# Patient Record
Sex: MALE | Race: WHITE | NOT HISPANIC OR LATINO | Employment: STUDENT
[De-identification: names, ages, dates, MRNs, and addresses within clinical notes are randomized per-mention and may not be internally consistent; named-entity substitution may affect disease eponyms.]

---

## 2017-05-26 ENCOUNTER — HEALTH MAINTENANCE LETTER (OUTPATIENT)
Age: 27
End: 2017-05-26

## 2017-07-06 RX ORDER — CETIRIZINE HYDROCHLORIDE 10 MG/1
10 TABLET ORAL DAILY PRN
COMMUNITY

## 2017-07-06 RX ORDER — TESTOSTERONE CYPIONATE 200 MG/ML
INJECTION, SOLUTION INTRAMUSCULAR WEEKLY
COMMUNITY

## 2017-07-06 RX ORDER — FLUTICASONE PROPIONATE 50 MCG
1 SPRAY, SUSPENSION (ML) NASAL DAILY
COMMUNITY

## 2017-07-07 ENCOUNTER — HOSPITAL ENCOUNTER (OUTPATIENT)
Facility: CLINIC | Age: 27
Discharge: HOME | End: 2017-07-07
Attending: PLASTIC SURGERY | Admitting: PLASTIC SURGERY
Payer: COMMERCIAL

## 2017-07-07 ENCOUNTER — ANESTHESIA (OUTPATIENT)
Dept: SURGERY | Facility: CLINIC | Age: 27
End: 2017-07-07
Payer: COMMERCIAL

## 2017-07-07 ENCOUNTER — ANESTHESIA EVENT (OUTPATIENT)
Dept: SURGERY | Facility: CLINIC | Age: 27
End: 2017-07-07
Payer: COMMERCIAL

## 2017-07-07 ENCOUNTER — SURGERY (OUTPATIENT)
Age: 27
End: 2017-07-07

## 2017-07-07 VITALS
RESPIRATION RATE: 16 BRPM | TEMPERATURE: 99 F | HEIGHT: 66 IN | WEIGHT: 211 LBS | SYSTOLIC BLOOD PRESSURE: 146 MMHG | BODY MASS INDEX: 33.91 KG/M2 | DIASTOLIC BLOOD PRESSURE: 106 MMHG | OXYGEN SATURATION: 95 %

## 2017-07-07 DIAGNOSIS — F64.0 GENDER DYSPHORIA IN ADOLESCENT AND ADULT: Primary | ICD-10-CM

## 2017-07-07 PROCEDURE — 71000013 ZZH RECOVERY PHASE 1 LEVEL 1 EA ADDTL HR: Performed by: PLASTIC SURGERY

## 2017-07-07 PROCEDURE — 27210995 ZZH RX 272: Performed by: PLASTIC SURGERY

## 2017-07-07 PROCEDURE — 88305 TISSUE EXAM BY PATHOLOGIST: CPT | Performed by: PLASTIC SURGERY

## 2017-07-07 PROCEDURE — 25000128 H RX IP 250 OP 636: Performed by: NURSE ANESTHETIST, CERTIFIED REGISTERED

## 2017-07-07 PROCEDURE — 40000170 ZZH STATISTIC PRE-PROCEDURE ASSESSMENT II: Performed by: PLASTIC SURGERY

## 2017-07-07 PROCEDURE — 36000058 ZZH SURGERY LEVEL 3 EA 15 ADDTL MIN: Performed by: PLASTIC SURGERY

## 2017-07-07 PROCEDURE — 25000128 H RX IP 250 OP 636: Performed by: ANESTHESIOLOGY

## 2017-07-07 PROCEDURE — 25000566 ZZH SEVOFLURANE, EA 15 MIN: Performed by: PLASTIC SURGERY

## 2017-07-07 PROCEDURE — S0020 INJECTION, BUPIVICAINE HYDRO: HCPCS | Performed by: PLASTIC SURGERY

## 2017-07-07 PROCEDURE — 25000132 ZZH RX MED GY IP 250 OP 250 PS 637: Performed by: PLASTIC SURGERY

## 2017-07-07 PROCEDURE — 71000027 ZZH RECOVERY PHASE 2 EACH 15 MINS: Performed by: PLASTIC SURGERY

## 2017-07-07 PROCEDURE — 37000008 ZZH ANESTHESIA TECHNICAL FEE, 1ST 30 MIN: Performed by: PLASTIC SURGERY

## 2017-07-07 PROCEDURE — 25000125 ZZHC RX 250: Performed by: PLASTIC SURGERY

## 2017-07-07 PROCEDURE — 25000132 ZZH RX MED GY IP 250 OP 250 PS 637: Performed by: ANESTHESIOLOGY

## 2017-07-07 PROCEDURE — 37000009 ZZH ANESTHESIA TECHNICAL FEE, EACH ADDTL 15 MIN: Performed by: PLASTIC SURGERY

## 2017-07-07 PROCEDURE — 27210794 ZZH OR GENERAL SUPPLY STERILE: Performed by: PLASTIC SURGERY

## 2017-07-07 PROCEDURE — 88305 TISSUE EXAM BY PATHOLOGIST: CPT | Mod: 26 | Performed by: PLASTIC SURGERY

## 2017-07-07 PROCEDURE — 25000128 H RX IP 250 OP 636: Performed by: PLASTIC SURGERY

## 2017-07-07 PROCEDURE — 36000056 ZZH SURGERY LEVEL 3 1ST 30 MIN: Performed by: PLASTIC SURGERY

## 2017-07-07 PROCEDURE — 25000125 ZZHC RX 250: Performed by: NURSE ANESTHETIST, CERTIFIED REGISTERED

## 2017-07-07 PROCEDURE — 71000012 ZZH RECOVERY PHASE 1 LEVEL 1 FIRST HR: Performed by: PLASTIC SURGERY

## 2017-07-07 RX ORDER — HYDROXYZINE HYDROCHLORIDE 25 MG/1
50 TABLET, FILM COATED ORAL ONCE
Status: COMPLETED | OUTPATIENT
Start: 2017-07-07 | End: 2017-07-07

## 2017-07-07 RX ORDER — SODIUM CHLORIDE, SODIUM LACTATE, POTASSIUM CHLORIDE, CALCIUM CHLORIDE 600; 310; 30; 20 MG/100ML; MG/100ML; MG/100ML; MG/100ML
INJECTION, SOLUTION INTRAVENOUS CONTINUOUS PRN
Status: DISCONTINUED | OUTPATIENT
Start: 2017-07-07 | End: 2017-07-07

## 2017-07-07 RX ORDER — HYDROCODONE BITARTRATE AND ACETAMINOPHEN 5; 325 MG/1; MG/1
1-2 TABLET ORAL EVERY 4 HOURS PRN
Qty: 40 TABLET | Refills: 0 | Status: SHIPPED | OUTPATIENT
Start: 2017-07-07

## 2017-07-07 RX ORDER — FENTANYL CITRATE 50 UG/ML
25-50 INJECTION, SOLUTION INTRAMUSCULAR; INTRAVENOUS
Status: DISCONTINUED | OUTPATIENT
Start: 2017-07-07 | End: 2017-07-07 | Stop reason: HOSPADM

## 2017-07-07 RX ORDER — ONDANSETRON 2 MG/ML
4 INJECTION INTRAMUSCULAR; INTRAVENOUS EVERY 30 MIN PRN
Status: DISCONTINUED | OUTPATIENT
Start: 2017-07-07 | End: 2017-07-07 | Stop reason: HOSPADM

## 2017-07-07 RX ORDER — FENTANYL CITRATE 50 UG/ML
INJECTION, SOLUTION INTRAMUSCULAR; INTRAVENOUS PRN
Status: DISCONTINUED | OUTPATIENT
Start: 2017-07-07 | End: 2017-07-07

## 2017-07-07 RX ORDER — MINERAL OIL
OIL (ML) MISCELLANEOUS
Status: DISCONTINUED
Start: 2017-07-07 | End: 2017-07-07 | Stop reason: WASHOUT

## 2017-07-07 RX ORDER — ONDANSETRON 4 MG/1
4 TABLET, ORALLY DISINTEGRATING ORAL EVERY 30 MIN PRN
Status: DISCONTINUED | OUTPATIENT
Start: 2017-07-07 | End: 2017-07-07 | Stop reason: HOSPADM

## 2017-07-07 RX ORDER — DEXAMETHASONE SODIUM PHOSPHATE 4 MG/ML
INJECTION, SOLUTION INTRA-ARTICULAR; INTRALESIONAL; INTRAMUSCULAR; INTRAVENOUS; SOFT TISSUE PRN
Status: DISCONTINUED | OUTPATIENT
Start: 2017-07-07 | End: 2017-07-07

## 2017-07-07 RX ORDER — CEFAZOLIN SODIUM 1 G/3ML
1 INJECTION, POWDER, FOR SOLUTION INTRAMUSCULAR; INTRAVENOUS SEE ADMIN INSTRUCTIONS
Status: DISCONTINUED | OUTPATIENT
Start: 2017-07-07 | End: 2017-07-07 | Stop reason: HOSPADM

## 2017-07-07 RX ORDER — PROPOFOL 10 MG/ML
INJECTION, EMULSION INTRAVENOUS CONTINUOUS PRN
Status: DISCONTINUED | OUTPATIENT
Start: 2017-07-07 | End: 2017-07-07

## 2017-07-07 RX ORDER — ACETAMINOPHEN 500 MG
1000 TABLET ORAL ONCE
Status: COMPLETED | OUTPATIENT
Start: 2017-07-07 | End: 2017-07-07

## 2017-07-07 RX ORDER — LIDOCAINE HYDROCHLORIDE 20 MG/ML
INJECTION, SOLUTION INFILTRATION; PERINEURAL PRN
Status: DISCONTINUED | OUTPATIENT
Start: 2017-07-07 | End: 2017-07-07

## 2017-07-07 RX ORDER — HYDROCODONE BITARTRATE AND ACETAMINOPHEN 5; 325 MG/1; MG/1
1-2 TABLET ORAL EVERY 4 HOURS PRN
Status: DISCONTINUED | OUTPATIENT
Start: 2017-07-07 | End: 2017-07-07 | Stop reason: HOSPADM

## 2017-07-07 RX ORDER — PROPOFOL 10 MG/ML
INJECTION, EMULSION INTRAVENOUS PRN
Status: DISCONTINUED | OUTPATIENT
Start: 2017-07-07 | End: 2017-07-07

## 2017-07-07 RX ORDER — ONDANSETRON 2 MG/ML
INJECTION INTRAMUSCULAR; INTRAVENOUS PRN
Status: DISCONTINUED | OUTPATIENT
Start: 2017-07-07 | End: 2017-07-07

## 2017-07-07 RX ORDER — CEFAZOLIN SODIUM 2 G/100ML
2 INJECTION, SOLUTION INTRAVENOUS
Status: COMPLETED | OUTPATIENT
Start: 2017-07-07 | End: 2017-07-07

## 2017-07-07 RX ORDER — NALOXONE HYDROCHLORIDE 0.4 MG/ML
.1-.4 INJECTION, SOLUTION INTRAMUSCULAR; INTRAVENOUS; SUBCUTANEOUS
Status: DISCONTINUED | OUTPATIENT
Start: 2017-07-07 | End: 2017-07-07 | Stop reason: HOSPADM

## 2017-07-07 RX ORDER — BUPIVACAINE HYDROCHLORIDE 2.5 MG/ML
INJECTION, SOLUTION EPIDURAL; INFILTRATION; INTRACAUDAL
Status: DISCONTINUED
Start: 2017-07-07 | End: 2017-07-07 | Stop reason: HOSPADM

## 2017-07-07 RX ORDER — BUPIVACAINE HYDROCHLORIDE 2.5 MG/ML
INJECTION, SOLUTION INFILTRATION; PERINEURAL PRN
Status: DISCONTINUED | OUTPATIENT
Start: 2017-07-07 | End: 2017-07-07 | Stop reason: HOSPADM

## 2017-07-07 RX ORDER — MEPERIDINE HYDROCHLORIDE 25 MG/ML
12.5 INJECTION INTRAMUSCULAR; INTRAVENOUS; SUBCUTANEOUS
Status: DISCONTINUED | OUTPATIENT
Start: 2017-07-07 | End: 2017-07-07 | Stop reason: HOSPADM

## 2017-07-07 RX ORDER — MAGNESIUM HYDROXIDE 1200 MG/15ML
LIQUID ORAL PRN
Status: DISCONTINUED | OUTPATIENT
Start: 2017-07-07 | End: 2017-07-07 | Stop reason: HOSPADM

## 2017-07-07 RX ORDER — FENTANYL CITRATE 50 UG/ML
25-50 INJECTION, SOLUTION INTRAMUSCULAR; INTRAVENOUS EVERY 5 MIN PRN
Status: DISCONTINUED | OUTPATIENT
Start: 2017-07-07 | End: 2017-07-07 | Stop reason: HOSPADM

## 2017-07-07 RX ORDER — SODIUM CHLORIDE, SODIUM LACTATE, POTASSIUM CHLORIDE, CALCIUM CHLORIDE 600; 310; 30; 20 MG/100ML; MG/100ML; MG/100ML; MG/100ML
INJECTION, SOLUTION INTRAVENOUS CONTINUOUS
Status: DISCONTINUED | OUTPATIENT
Start: 2017-07-07 | End: 2017-07-07 | Stop reason: HOSPADM

## 2017-07-07 RX ORDER — CEFAZOLIN SODIUM 1 G/3ML
INJECTION, POWDER, FOR SOLUTION INTRAMUSCULAR; INTRAVENOUS PRN
Status: DISCONTINUED | OUTPATIENT
Start: 2017-07-07 | End: 2017-07-07

## 2017-07-07 RX ADMIN — PROPOFOL 200 MCG/KG/MIN: 10 INJECTION, EMULSION INTRAVENOUS at 14:03

## 2017-07-07 RX ADMIN — PROPOFOL: 10 INJECTION, EMULSION INTRAVENOUS at 15:06

## 2017-07-07 RX ADMIN — FENTANYL CITRATE 50 MCG: 50 INJECTION, SOLUTION INTRAMUSCULAR; INTRAVENOUS at 15:15

## 2017-07-07 RX ADMIN — HYDROCODONE BITARTRATE AND ACETAMINOPHEN 1 TABLET: 5; 325 TABLET ORAL at 17:33

## 2017-07-07 RX ADMIN — HYDROMORPHONE HYDROCHLORIDE 0.5 MG: 1 INJECTION, SOLUTION INTRAMUSCULAR; INTRAVENOUS; SUBCUTANEOUS at 17:15

## 2017-07-07 RX ADMIN — SODIUM CHLORIDE 1000 ML: 0.9 IRRIGANT IRRIGATION at 14:40

## 2017-07-07 RX ADMIN — PROPOFOL 200 MG: 10 INJECTION, EMULSION INTRAVENOUS at 14:03

## 2017-07-07 RX ADMIN — FENTANYL CITRATE 50 MCG: 50 INJECTION, SOLUTION INTRAMUSCULAR; INTRAVENOUS at 14:07

## 2017-07-07 RX ADMIN — CEFAZOLIN 1 G: 1 INJECTION, POWDER, FOR SOLUTION INTRAMUSCULAR; INTRAVENOUS at 16:05

## 2017-07-07 RX ADMIN — SODIUM CHLORIDE, POTASSIUM CHLORIDE, SODIUM LACTATE AND CALCIUM CHLORIDE: 600; 310; 30; 20 INJECTION, SOLUTION INTRAVENOUS at 14:01

## 2017-07-07 RX ADMIN — LIDOCAINE HYDROCHLORIDE 60 MG: 20 INJECTION, SOLUTION INFILTRATION; PERINEURAL at 14:03

## 2017-07-07 RX ADMIN — PROPOFOL: 10 INJECTION, EMULSION INTRAVENOUS at 14:32

## 2017-07-07 RX ADMIN — MIDAZOLAM HYDROCHLORIDE 2 MG: 1 INJECTION, SOLUTION INTRAMUSCULAR; INTRAVENOUS at 14:01

## 2017-07-07 RX ADMIN — ONDANSETRON 4 MG: 2 INJECTION INTRAMUSCULAR; INTRAVENOUS at 15:56

## 2017-07-07 RX ADMIN — FENTANYL CITRATE 50 MCG: 50 INJECTION, SOLUTION INTRAMUSCULAR; INTRAVENOUS at 14:52

## 2017-07-07 RX ADMIN — HYDROMORPHONE HYDROCHLORIDE 0.5 MG: 1 INJECTION, SOLUTION INTRAMUSCULAR; INTRAVENOUS; SUBCUTANEOUS at 17:26

## 2017-07-07 RX ADMIN — SODIUM CHLORIDE, POTASSIUM CHLORIDE, SODIUM LACTATE AND CALCIUM CHLORIDE: 600; 310; 30; 20 INJECTION, SOLUTION INTRAVENOUS at 15:12

## 2017-07-07 RX ADMIN — HYDROXYZINE HYDROCHLORIDE 50 MG: 25 TABLET ORAL at 11:40

## 2017-07-07 RX ADMIN — CEFAZOLIN SODIUM 2 G: 2 INJECTION, SOLUTION INTRAVENOUS at 14:06

## 2017-07-07 RX ADMIN — FENTANYL CITRATE 50 MCG: 50 INJECTION, SOLUTION INTRAMUSCULAR; INTRAVENOUS at 14:20

## 2017-07-07 RX ADMIN — BUPIVACAINE HYDROCHLORIDE 30 ML: 2.5 INJECTION, SOLUTION EPIDURAL; INFILTRATION; INTRACAUDAL; PERINEURAL at 15:57

## 2017-07-07 RX ADMIN — ACETAMINOPHEN 975 MG: 325 TABLET, FILM COATED ORAL at 11:40

## 2017-07-07 RX ADMIN — FENTANYL CITRATE 50 MCG: 50 INJECTION, SOLUTION INTRAMUSCULAR; INTRAVENOUS at 14:01

## 2017-07-07 RX ADMIN — PROPOFOL 50 MG: 10 INJECTION, EMULSION INTRAVENOUS at 15:15

## 2017-07-07 RX ADMIN — DEXAMETHASONE SODIUM PHOSPHATE 4 MG: 4 INJECTION, SOLUTION INTRA-ARTICULAR; INTRALESIONAL; INTRAMUSCULAR; INTRAVENOUS; SOFT TISSUE at 14:07

## 2017-07-07 NOTE — DISCHARGE INSTRUCTIONS
While you were at the hospital today you were given 975 mg of Tylenol. The recommended daily maximum dose is 4000 mg.     Same Day Surgery Discharge Instructions for  Sedation and General Anesthesia       It's not unusual to feel dizzy, light-headed or faint for up to 24 hours after surgery or while taking pain medication.  If you have these symptoms: sit for a few minutes before standing and have someone assist you when you get up to walk or use the bathroom.      You should rest and relax for the next 24 hours. We recommend you make arrangements to have an adult stay with you for at least 24 hours after your discharge.  Avoid hazardous and strenuous activity.      DO NOT DRIVE any vehicle or operate mechanical equipment for 24 hours following the end of your surgery.  Even though you may feel normal, your reactions may be affected by the medication you have received.      Do not drink alcoholic beverages for 24 hours following surgery.       Slowly progress to your regular diet as you feel able. It's not unusual to feel nauseated and/or vomit after receiving anesthesia.  If you develop these symptoms, drink clear liquids (apple juice, ginger ale, broth, 7-up, etc. ) until you feel better.  If your nausea and vomiting persists for 24 hours, please notify your surgeon.        All narcotic pain medications, along with inactivity and anesthesia, can cause constipation. Drinking plenty of liquids and increasing fiber intake will help.      For any questions of a medical nature, call your surgeon.      Do not make important decisions for 24 hours.      If you had general anesthesia, you may have a sore throat for a couple of days related to the breathing tube used during surgery.  You may use Cepacol lozenges to help with this discomfort.  If it worsens or if you develop a fever, contact your surgeon.       If you feel your pain is not well managed with the pain medications prescribed by your surgeon, please contact  your surgeon's office to let them know so they can address your concerns.       **If you have questions or concerns about your procedure,  call  at [phone]**    Discharge Instructions following Breast Surgery  [facility] Same Day [facility]    Diet:   Resume diet as tolerated.  Drink plenty of fluids to prevent constipation.    Activity:   Gentle rotation & stretching of your arms/shoulders will prevent stiffness in joints   Increase activity gradually   No heavy lifting greater than 10-15 pounds & no strenuous activity until  approved by surgeon    Bathing/Incision Care:   You may shower as directed by surgeon   Pat incisions dry.  No lotions, powders or perfumes to incisions   Tape dressings (steri strips) will fall off in 7-10 days (if present)    What to expect:   A tingly or itchy sensation around the incision is a normal sign of healing   Some clear, pink drainage from incisions is normal.      Notify your surgeon for the following signs & symptoms:   Redness, warmth, or swelling of the incision    Foul smelling or increased drainage   Chills or temperature greater than 101 F   Pain not controlled by pain medications    Lloyd Chavez Drain  Home Care Instructions    What is a Lloyd Chavez (MAAME) Drain?  This is a small tube that connects to a bulb.  Its gentle suction removes extra fluid from a surgical wound.  Your doctor will remove the tube when the amount of fluid decreases.  The color and amount of fluid varies.  Right after surgery the fluid is bright red.  Over time, it changes to light pink and may become clear or the color of straw.    How should I care for my tube site?    Keep the skin around the tube dry.  Check with your doctor about how to shower.  You may need to cover the site with plastic when you shower.  Or, it may be okay to let the site get wet and put on a clean bandage after you shower.      If the bandage gets wet, you will need to change it.  How should I care for the  bulb?    Keep the bulb compressed at all times except while you empty it.     Attach the bulb to your clothing with tape and a safety pin.    Try to empty the bulb at the same time every day.  Empty the bulb at least once a day, or when the bulb becomes half full.  If it becomes too full, there will not be enough suction.    To empty the bulb:    Wash your hands.    Open the bulb cap.    Drain the fluid from the bulb into the measuring cup.  If you have two drains, use two cups.      Clean the mouth of the bulb with an alcohol wipe if your nurse told you to.    Squeeze the bulb (fold it in half before you close the bulb cap) If it does not stay compressed, call your nurse or clinic.    Write the amount of drainage on the drainage record (see back page).  If you have two drains, write the amount for each bulb.    Flush the drainage down the toilet.  Rinse the measuring cup.    Wash your hands.    When should I call my doctor?   Call your doctor if:    You have a fever over 101 F (38.3 C), taken under the tongue.     The drainage increases or smells bad.    The skin around your tube has increased redness, swelling, warmth or pain.    You have pus or fluid leaking at the tube site.    Your stitches break.    You think the tube is not draining.    The tube falls out.    You have any problems or concerns.    Your drainage record:    Empty your bulb at least once per day or when 1/2 to 1/3 full.  Write down the date, time and amount of drainage in each bulb.   Bring this record to each clinic visit.    Date Time Bulb 1: amount of  Drainage in (ml or cc) Bulb 2: amount of drainage (in ml or cc) Notes

## 2017-07-07 NOTE — IP AVS SNAPSHOT
[facility]    [address]    [place] [address]    Phone:  [phone]    Fax:  [phone]                                       After Visit Summary   7/7/2017    Van Ochoa    MRN: [id number]           After Visit Summary Signature Page     I have received my discharge instructions, and my questions have been answered. I have discussed any challenges I see with this plan with the nurse or doctor.    ..........................................................................................................................................  Patient/Patient Representative Signature      ..........................................................................................................................................  Patient Representative Print Name and Relationship to Patient    ..................................................               ................................................  Date                                            Time    ..........................................................................................................................................  Reviewed by Signature/Title    ...................................................              ..............................................  Date                                                            Time

## 2017-07-07 NOTE — ANESTHESIA CARE TRANSFER NOTE
Patient: Van Ochoa    Procedure(s):  BILATERAL MASTECTOMY  - Wound Class: I-Clean    Diagnosis: GENDER IDENTITY DISORDER   Diagnosis Additional Information: No value filed.    Anesthesia Type:   General, LMA     Note:  Airway :LMA  Patient transferred to:PACU        Vitals: (Last set prior to Anesthesia Care Transfer)    CRNA VITALS  7/7/2017 1554 - 7/7/2017 1626      7/7/2017             Pulse: 63    SpO2: 93 %    Resp Rate (observed):                 Electronically Signed By: MARCY Ceja CRNA  July 7, 2017  4:26 PM

## 2017-07-07 NOTE — BRIEF OP NOTE
[facility] Brief Operative Note    Pre-operative diagnosis: GENDER IDENTITY DISORDER    Post-operative diagnosis gender dysphoria     Procedure: Procedure(s):  BILATERAL MASTECTOMY  - Wound Class: I-Clean   Surgeon(s): Surgeon(s) and Role:     * Daphne Ibrahim MD - Primary   Estimated blood loss: 20 mL    Specimens:   ID Type Source Tests Collected by Time Destination   A : Left breast tissue-  (Weight= 490 grams) Tissue Breast, Left SURGICAL PATHOLOGY EXAM Daphne Ibrahim MD 7/7/2017  3:12 PM    B : Right breast tissue-  (Weight= 455 grams) Tissue Breast, Right SURGICAL PATHOLOGY EXAM Daphne Ibrahim MD 7/7/2017  3:46 PM       Findings: See op note.

## 2017-07-07 NOTE — OR NURSING
Anesthesia brought pt to PACU per cart and LMA intact per facial mask on @10 L. Pt not responding to name. Bulb syringes  bilateral to gravity per chest  and not to  suction per surgeon.

## 2017-07-07 NOTE — ANESTHESIA PREPROCEDURE EVALUATION
Anesthesia Evaluation     . Pt has had prior anesthetic. Type: General    No history of anesthetic complications          ROS/MED HX    ENT/Pulmonary:     (+)Intermittent asthma , . .    Neurologic:       Cardiovascular:  - neg cardiovascular ROS       METS/Exercise Tolerance:     Hematologic: Comments: polycythemia        Musculoskeletal:         GI/Hepatic:  - neg GI/hepatic ROS       Renal/Genitourinary:         Endo:     (+) thyroid problem hypothyroidism, .      Psychiatric: Comment: ADHD    (+) psychiatric history anxiety and depression      Infectious Disease:         Malignancy:         Other:                     Physical Exam  Normal systems: cardiovascular, pulmonary and dental    Airway   Mallampati: I  TM distance: >3 FB  Neck ROM: full    Dental     Cardiovascular   Rhythm and rate: regular and normal      Pulmonary    breath sounds clear to auscultation                    Anesthesia Plan      History & Physical Review  History and physical reviewed and following examination; no interval change.    ASA Status:  2 .    NPO Status:  > 8 hours    Plan for General and LMA with Propofol induction.   PONV prophylaxis:  Ondansetron (or other 5HT-3) and Dexamethasone or Solumedrol  #4 LMA      Postoperative Care  Postoperative pain management:  IV analgesics and Oral pain medications.      Consents  Anesthetic plan, risks, benefits and alternatives discussed with:  Patient..                          .

## 2017-07-07 NOTE — IP AVS SNAPSHOT
MRN:[id number]                      After Visit Summary   7/7/2017    Van Ochoa    MRN: [id number]           Thank you!     Thank you for choosing [facility] for your care. Our goal is always to provide you with excellent care. Hearing back from our patients is one way we can continue to improve our services. Please take a few minutes to complete the written survey that you may receive in the mail after you visit with us. Thank you!        Patient Information     Date Of Birth          1990        About your hospital stay     You were admitted on:  July 7, 2017 You last received care in the:  [facility]    You were discharged on:  July 7, 2017        Reason for your hospital stay       Surgery.                  Who to Call     For medical emergencies, please call 911.  For non-urgent questions about your medical care, please call your primary care provider or clinic, [phone]  For questions related to your surgery, please call your surgery clinic        Attending Provider     Provider Daphne Nielson MD Plastic Surgery       Primary Care Provider Office Phone # Fax #    He Garcia [phone]      After Care Instructions     Discharge Instructions       Follow up with Dr. Ibrahim in 1 weeks.            Wound care and dressings       Remove dressing 24-48 hours after surgery.  Then may shower with incisions uncovered. Apply Aquaphor to incisions daily. No restrictions on arm movements. Avoid heavy lifting or strenuous exercise for 2 weeks. Ace wrap or compression shirt at all times. Strip drains daily, record output daily. May use OTC ibuprofen/tylenol for discomfort.                  Further instructions from your care team       While you were at the hospital today you were given 975 mg of Tylenol. The recommended daily maximum dose is 4000 mg.     Same Day Surgery Discharge Instructions for  Sedation and General  Anesthesia       It's not unusual to feel dizzy, light-headed or faint for up to 24 hours after surgery or while taking pain medication.  If you have these symptoms: sit for a few minutes before standing and have someone assist you when you get up to walk or use the bathroom.      You should rest and relax for the next 24 hours. We recommend you make arrangements to have an adult stay with you for at least 24 hours after your discharge.  Avoid hazardous and strenuous activity.      DO NOT DRIVE any vehicle or operate mechanical equipment for 24 hours following the end of your surgery.  Even though you may feel normal, your reactions may be affected by the medication you have received.      Do not drink alcoholic beverages for 24 hours following surgery.       Slowly progress to your regular diet as you feel able. It's not unusual to feel nauseated and/or vomit after receiving anesthesia.  If you develop these symptoms, drink clear liquids (apple juice, ginger ale, broth, 7-up, etc. ) until you feel better.  If your nausea and vomiting persists for 24 hours, please notify your surgeon.        All narcotic pain medications, along with inactivity and anesthesia, can cause constipation. Drinking plenty of liquids and increasing fiber intake will help.      For any questions of a medical nature, call your surgeon.      Do not make important decisions for 24 hours.      If you had general anesthesia, you may have a sore throat for a couple of days related to the breathing tube used during surgery.  You may use Cepacol lozenges to help with this discomfort.  If it worsens or if you develop a fever, contact your surgeon.       If you feel your pain is not well managed with the pain medications prescribed by your surgeon, please contact your surgeon's office to let them know so they can address your concerns.       **If you have questions or concerns about your procedure,  call  at [phone]**    Discharge  Instructions following Breast Surgery  [facility] Day [facility]    Diet:   Resume diet as tolerated.  Drink plenty of fluids to prevent constipation.    Activity:   Gentle rotation & stretching of your arms/shoulders will prevent stiffness in joints   Increase activity gradually   No heavy lifting greater than 10-15 pounds & no strenuous activity until  approved by surgeon    Bathing/Incision Care:   You may shower as directed by surgeon   Pat incisions dry.  No lotions, powders or perfumes to incisions   Tape dressings (steri strips) will fall off in 7-10 days (if present)    What to expect:   A tingly or itchy sensation around the incision is a normal sign of healing   Some clear, pink drainage from incisions is normal.      Notify your surgeon for the following signs & symptoms:   Redness, warmth, or swelling of the incision    Foul smelling or increased drainage   Chills or temperature greater than 101 F   Pain not controlled by pain medications    Lloyd Chavez Drain  Home Care Instructions    What is a Lloyd Chavez (MAAME) Drain?  This is a small tube that connects to a bulb.  Its gentle suction removes extra fluid from a surgical wound.  Your doctor will remove the tube when the amount of fluid decreases.  The color and amount of fluid varies.  Right after surgery the fluid is bright red.  Over time, it changes to light pink and may become clear or the color of straw.    How should I care for my tube site?    Keep the skin around the tube dry.  Check with your doctor about how to shower.  You may need to cover the site with plastic when you shower.  Or, it may be okay to let the site get wet and put on a clean bandage after you shower.      If the bandage gets wet, you will need to change it.  How should I care for the bulb?    Keep the bulb compressed at all times except while you empty it.     Attach the bulb to your clothing with tape and a safety pin.    Try to empty the bulb at the same time  "every day.  Empty the bulb at least once a day, or when the bulb becomes half full.  If it becomes too full, there will not be enough suction.    To empty the bulb:    Wash your hands.    Open the bulb cap.    Drain the fluid from the bulb into the measuring cup.  If you have two drains, use two cups.      Clean the mouth of the bulb with an alcohol wipe if your nurse told you to.    Squeeze the bulb (fold it in half before you close the bulb cap) If it does not stay compressed, call your nurse or clinic.    Write the amount of drainage on the drainage record (see back page).  If you have two drains, write the amount for each bulb.    Flush the drainage down the toilet.  Rinse the measuring cup.    Wash your hands.    When should I call my doctor?   Call your doctor if:    You have a fever over 101 F (38.3 C), taken under the tongue.     The drainage increases or smells bad.    The skin around your tube has increased redness, swelling, warmth or pain.    You have pus or fluid leaking at the tube site.    Your stitches break.    You think the tube is not draining.    The tube falls out.    You have any problems or concerns.    Your drainage record:    Empty your bulb at least once per day or when 1/2 to 1/3 full.  Write down the date, time and amount of drainage in each bulb.   Bring this record to each clinic visit.    Date Time Bulb 1: amount of  Drainage in (ml or cc) Bulb 2: amount of drainage (in ml or cc) Notes                                                        Pending Results     No orders found from 7/5/2017 to 7/8/2017.            Admission Information     Date & Time Provider Department Dept. Phone    7/7/2017 Daphne Ibrahim MD [facility] [phone]      Your Vitals Were     Blood Pressure Temperature Respirations Height Weight Pulse Oximetry    146/106 99  F (37.2  C) 16 1.676 m (5' 6\") 95.7 kg (211 lb) 95%    BMI (Body Mass Index)                   34.06 kg/m2      " "     MyChart Information     [facility] lets you send messages to your doctor, view your test results, renew your prescriptions, schedule appointments and more. To sign up, go to [email or web address] . Click on \"Log in\" on the left side of the screen, which will take you to the Welcome page. Then click on \"Sign up Now\" on the right side of the page.     You will be asked to enter the access code listed below, as well as some personal information. Please follow the directions to create your username and password.     Your access code is: [email or web address]  Expires: 10/5/2017  4:13 PM     Your access code will  in 90 days. If you need help or a new code, please call your [facility] clinic or [phone].        Care EveryWhere ID     This is your Care EveryWhere ID. This could be used by other organizations to access your [facility] medical records  [id number]        Equal Access to Services     WINSOME BENITEZ : Leslie Flores, watabbyda lunarendra, qaybta kaalmaabbey treadwell, stevie gunderson . So [facility] [phone].    ATENCIÓN: Si truptila que, tiene a alexander disposición servicios gratuitos de asistencia lingüística. Llame al [phone].    We comply with applicable federal civil rights laws and [place] laws. We do not discriminate on the basis of race, color, national origin, age, disability sex, sexual orientation or gender identity.               Review of your medicines      START taking        Dose / Directions    HYDROcodone-acetaminophen 5-325 MG per tablet   Commonly known as:  NORCO   Used for:  Gender dysphoria in adolescent and adult   Notes to Patient:  1 tablet tablet taken at 5:33 p.m        Dose:  1-2 tablet   Take 1-2 tablets by mouth every 4 hours as needed for moderate to severe pain   Quantity:  40 tablet   Refills:  0         CONTINUE these medicines which have NOT CHANGED        Dose / Directions    cetirizine 10 MG tablet   Commonly known as:  zyrTEC        Dose: "  10 mg   Take 10 mg by mouth daily as needed for allergies   Refills:  0       fluticasone 50 MCG/ACT spray   Commonly known as:  FLONASE        Dose:  1 spray   Spray 1 spray into both nostrils daily   Refills:  0       HYDROCHLOROTHIAZIDE PO        Dose:  12.5 mg   Take 12.5 mg by mouth daily   Refills:  0       LEXAPRO PO        Dose:  20 mg   Take 20 mg by mouth daily   Refills:  0       NORVASC PO        Dose:  2.5 mg   Take 2.5 mg by mouth daily   Refills:  0       PRILOSEC PO        Dose:  20 mg   Take 20 mg by mouth every morning   Refills:  0       SYNTHROID PO        Dose:  100 mcg   Take 100 mcg by mouth daily   Refills:  0       testosterone cypionate 200 MG/ML injection   Commonly known as:  DEPOTESTOTERONE        Inject into the muscle once a week Inject .35 mls weekly   Refills:  0       TOPROL XL PO        Dose:  25 mg   Take 25 mg by mouth daily   Refills:  0            Where to get your medicines      Some of these will need a paper prescription and others can be bought over the counter. Ask your nurse if you have questions.     Bring a paper prescription for each of these medications     HYDROcodone-acetaminophen 5-325 MG per tablet                Protect others around you: Learn how to safely use, store and throw away your medicines at www.disposemymeds.org.             Medication List: This is a list of all your medications and when to take them. Check marks below indicate your daily home schedule. Keep this list as a reference.      Medications           Morning Afternoon Evening Bedtime As Needed    cetirizine 10 MG tablet   Commonly known as:  zyrTEC   Take 10 mg by mouth daily as needed for allergies                                fluticasone 50 MCG/ACT spray   Commonly known as:  FLONASE   Spray 1 spray into both nostrils daily                                HYDROCHLOROTHIAZIDE PO   Take 12.5 mg by mouth daily                                HYDROcodone-acetaminophen 5-325 MG per tablet    Commonly known as:  NORCO   Take 1-2 tablets by mouth every 4 hours as needed for moderate to severe pain   Last time this was given:  1 tablet on 7/7/2017  5:33 PM   Notes to Patient:  1 tablet tablet taken at 5:33 p.m                                LEXAPRO PO   Take 20 mg by mouth daily                                NORVASC PO   Take 2.5 mg by mouth daily                                PRILOSEC PO   Take 20 mg by mouth every morning                                SYNTHROID PO   Take 100 mcg by mouth daily                                testosterone cypionate 200 MG/ML injection   Commonly known as:  DEPOTESTOTERONE   Inject into the muscle once a week Inject .35 mls weekly                                TOPROL XL PO   Take 25 mg by mouth daily

## 2017-07-07 NOTE — ANESTHESIA POSTPROCEDURE EVALUATION
Patient: Van Ochoa    Procedure(s):  BILATERAL MASTECTOMY  - Wound Class: I-Clean    Diagnosis:GENDER IDENTITY DISORDER   Diagnosis Additional Information: No value filed.    Anesthesia Type:  General, LMA    Note:  Anesthesia Post Evaluation    Patient location during evaluation: PACU  Patient participation: Able to fully participate in evaluation  Level of consciousness: awake  Pain management: adequate  Airway patency: patent  Cardiovascular status: acceptable  Respiratory status: acceptable  Hydration status: acceptable  PONV: none     Anesthetic complications: None          Last vitals:  Vitals:    07/07/17 1151 07/07/17 1156   BP: (!) 125/91    Resp: 16    Temp: 36.2  C (97.1  F)    SpO2:  94%         Electronically Signed By: Seven Puckett MD  July 7, 2017  4:30 PM

## 2017-07-10 NOTE — OP NOTE
PLASTIC SURGERY OPERATIVE NOTE  Patient Name:  Van Ochoa     Date of Service:  7/7/2017     PREOPERATIVE DIAGNOSES:   1.  GENDER IDENTITY DISORDER      POSTOPERATIVE DIAGNOSES:   1.  GENDER IDENTITY DISORDER        SURGEON:  Daphne Ibrahim MD   OR STAFF:  Circulator: Roseline Lechuga, MICHELLE; Hilary Sheldon RN  Relief Scrub: Yenni Melissa  Scrub Person: Izabela Pablo     ANESTHESIA:  General     ESTIMATED BLOOD LOSS:  * No blood loss amount entered *   SPECIMEN(S):    ID Type Source Tests Collected by Time Destination   A : Left breast tissue-  (Weight= 490 grams) Tissue Breast, Left SURGICAL PATHOLOGY EXAM Daphne Ibrahim MD 7/7/2017  3:12 PM    B : Right breast tissue-  (Weight= 455 grams) Tissue Breast, Right SURGICAL PATHOLOGY EXAM Daphne Ibrahim MD 7/7/2017  3:46 PM         PROCEDURES:   1.  Bilateral mastectomies      DESCRIPTION OF PROCEDURE:  Patient was marked for incisions and taken to the operating room.  General anesthesia was administered.  The chest area was prepped and draped in a sterile manner.  On the right side, an incision was made around the nipple area and an inferior pedicle was designed.  This area was de-epithelialized.  The nipple areolar complex and pedicle were then delineated with electrocautery.  Hemostasis was achieved.    A curvilinear transverse incision was then made across the breast following the preoperative markings.  The majority of breast tissue was removed by dissecting superiorly until the pectoralis muscle was encountered.  Additional dissection was done anteriorly and a portion of breast tissue was removed from the pectoralis muscle using electrocautery.  Additional sections of breast tissue were removed to allow for smooth contour with closure.  All breast tissue that was removed was sent to pathology.  Hemostasis was achieved.    Patient is brought the upright position and excess skin along the inframammary fold was marked.  Patient was  returned to supine position.  The excess skin was sharply excised and hemostasis was achieved.  Additional contouring was sent with electrocautery to allow for insetting of the inferior pedicle.  A 15-Sudanese MAAME drain was placed.  The skin incision was reapproximated with interrupted 3-0 Monocryl in the subcutaneous tissue followed by running 4-0 subcuticular Monocryl suture.    A similar procedure was completed on the other side.  An incision was made around the left nipple area and an inferior pedicle was designed.  This area was de-epithelialized.  The nipple areolar complex and pedicle were then delineated with electrocautery.  Hemostasis was achieved.    A curvilinear transverse incision was then made across the breast following the preoperative markings.  The majority of breast tissue was removed by dissecting superiorly until the pectoralis muscle was encountered.  Additional dissection was done anteriorly and a portion of breast tissue was removed from the pectoralis muscle using electrocautery.  Additional sections of breast tissue were removed to allow for smooth contour with closure.  All breast tissue that was removed was sent to pathology.  Hemostasis was achieved.    Patient is brought the upright position and excess skin along the inframammary fold was marked.  Patient was returned to supine position.  The excess skin was sharply excised and hemostasis was achieved.  Additional contouring was sent with electrocautery to allow for insetting of the inferior pedicle.  A 15-Sudanese MAAME drain was placed.  The skin incision was reapproximated with interrupted 3-0 Monocryl in the subcutaneous tissue followed by running 4-0 subcuticular Monocryl suture.    Patient was brought to the upright position and the new nipple position was marked.  Patient was returned to supine position.  The excess skin and underlying breast tissue was removed following the markings.  The nipple areolar complex was matured with  interrupted 4-0 Monocryl in the subcutaneous tissue followed by a running 5-0 fast absorbing suture.  This was done bilaterally.    Xeroform followed by light dressing was applied and the patient was circumferentially wrapped with 44 inch Ace bandage.    Left breast tissue weighed 490 g and right breast tissue weighed 455 g.          IMPLANTS:  * No implants in log *    Daphne Ibrahim MD  Plastic Surgery  [facility]  [phone] (office)

## 2017-07-11 LAB — COPATH REPORT: NORMAL

## 2022-04-19 ENCOUNTER — TELEPHONE (OUTPATIENT)
Dept: BEHAVIORAL HEALTH | Facility: CLINIC | Age: 32
End: 2022-04-19
Payer: COMMERCIAL

## 2022-04-19 NOTE — TELEPHONE ENCOUNTER
R: The pt is currently in the [facility] awaiting placement.     8:55a Intake awaiting DEC and labs.  [facility] at capacity. The pt remains on the waitlist. Intake continues to identify appropriate bed placement.     8:56a Intake called [facility] to request labs and clinical be faxed to Intake. Intake awaiting clinical.     12:26p Intake received notification from P the pt yenni be reassessed by the provider for IP MH. Intake awaiting reassessment. The work-list has been updated.      4:18p Intake called [facility] for an update on the the pt's reassessment. Per ER RN OUMAR is still recommending IP MH. The pt remains on the work-list.

## 2022-04-19 NOTE — TELEPHONE ENCOUNTER
"Patient cleared and ready for behavioral bed placement: Yes   S: 06:32 DEC call, 32/M, [facility], psychosis    B: Pt presents as tangential, nonsensical and endorsing AH and VH. During DEC assessment, pt would provider answers that were out of context: \"I figured out what they wanted,\" \"I'm sick of being treated like a dog\" and \"I want to be a peaceful monk.\" Hx of anxiety, depression, ADHD and autism. Unclear if pt has been taking medications. Pt was living w/ GF in [place], but they returned home d/t pt's declining mental health. Pt denies drug and alcohol use - utox pending. Pt got an IM haldol in the ED. Pt was not physically aggressive but staff had to get hands on to give pt IM, to which pt thanked staff afterwards. No acute medical concerns. Medically cleared, eating, drinking, ambulating indep    A: Voluntary, but holdable    Covid ordered    R Pt placed on work list and intake awaiting all ED clinical, labwork and DEC assessment - Called ED @ 06:44 to request everything be faxed to intake for review.       "

## 2022-04-20 ENCOUNTER — TELEPHONE (OUTPATIENT)
Dept: BEHAVIORAL HEALTH | Facility: CLINIC | Age: 32
End: 2022-04-20

## 2022-04-20 NOTE — TELEPHONE ENCOUNTER
R:  No beds currently available within [facility] system. Called ED @ 06:00 to confirm pt still awaiting IPMH placement in the ED. This writer was informed that pt was discharged from the ED and IPMH bed is no longer needed.    Pt removed from wait list at this time time and intake no longer following

## 2022-06-01 ENCOUNTER — OFFICE VISIT (OUTPATIENT)
Dept: OBGYN | Facility: CLINIC | Age: 32
End: 2022-06-01
Payer: COMMERCIAL

## 2022-06-01 VITALS
WEIGHT: 193 LBS | SYSTOLIC BLOOD PRESSURE: 145 MMHG | HEIGHT: 67 IN | DIASTOLIC BLOOD PRESSURE: 92 MMHG | BODY MASS INDEX: 30.29 KG/M2 | HEART RATE: 80 BPM

## 2022-06-01 DIAGNOSIS — N93.9 ABNORMAL UTERINE BLEEDING: Primary | ICD-10-CM

## 2022-06-01 DIAGNOSIS — R10.2 PELVIC PAIN: ICD-10-CM

## 2022-06-01 PROCEDURE — G0463 HOSPITAL OUTPT CLINIC VISIT: HCPCS

## 2022-06-01 PROCEDURE — 99203 OFFICE O/P NEW LOW 30 MIN: CPT | Mod: GE | Performed by: OBSTETRICS & GYNECOLOGY

## 2022-06-01 RX ORDER — MULTIVITAMIN
1 TABLET ORAL DAILY
COMMUNITY
Start: 2022-04-24

## 2022-06-01 RX ORDER — LABETALOL 100 MG/1
100 TABLET, FILM COATED ORAL
COMMUNITY
Start: 2022-04-23 | End: 2023-05-31

## 2022-06-01 NOTE — PROGRESS NOTES
[facility]  Gynecology Visit    Reason for Visit: h/o cysts and fibroids    HPI:    Van Ochoa is a 32 year old G0 trans man (he/him) here for discussion of ovarian cysts and fibroids. Here with his female partner. They report that he was recently admitted at [facility] for mental health reasons. At that time had pelvic US done and was told he had cysts and a fibroid in his uterus.     He has been off of testosterone for the past few years as he was considering trying to conceive and carry a pregnancy. He and his partner are in a polyamorous relationship, he was sexually active with a male partner and was attempting pregnancy in that way but did not conceive. Never met with PRO or had fertility evaluation. That relationship ultimately ended and now he is planning to restart testosterone due to gender dysphoria.     While off of testosterone he has noticed fairly regular cycles with bleeding every 25-32 days. Length of bleeding and how heavy periods are is variable. He notices some occasional sharp, stabbing pain on the R side intermittently for the past few months. This is very short-lived (seconds) and overall not disruptive to his life. He does have a history of fibromyalgia and feels sometimes it's difficult to distinguish between fibromyalgia pain and other pain. However, does report generally has painful periods that require heat and pressure to manage.     He reports a 60 lbs unintentional weight loss in the last 10 months (250 to 190 lbs). Feels like he sometimes forgets to eat because of his ADHD. He occasionally has some night sweats and hot flashes, but has not noticed any vaginal dryness.      GYN History  Menses: as above  Sexually active: yes, with female and male partners  History of STIs: none, recently tested  Pap Smears: 9/2020 NILM, HPV neg  History of abnormal paps: none  Contraception: condoms      OBHx  G0    PMH  Past Medical History:   Diagnosis Date     ADHD      Anxiety      Asthma       Depression      Environmental allergies      Gastroesophageal reflux disease      Gender dysphoria in adult      Hypertension      Hypothyroid      Polycythemia      PSH  Past Surgical History:   Procedure Laterality Date     ENT SURGERY      Tonsillectomy     TRANSGENDER MASTECTOMY Bilateral 7/7/2017    Procedure: TRANSGENDER MASTECTOMY;  BILATERAL MASTECTOMY ;  Surgeon: Daphne Ibrahim MD;  Location: [facility]   Spinal fusion surgery     Medications      Allergies  Allergies   Allergen Reactions     Atomoxetine Other (See Comments)     Worsening depression      Banana      Itchy throat       Lisinopril Other (See Comments)     Elevated Creatinine     Morphine      itchy     Morphine Itching     Adhesive Tape Rash     Family Hx  Maternal grandmother with breast cancer. No other known family history of breast, ovarian, uterine, or cervical cancer. No known family hx of bleeding disorder, clotting disorder.     Social Hx  Tobacco use - no  Drug use - marijuana, daily user  Alcohol use - socially   Disability, not currently. Lives with partner and mom. Feels safe at home.   Social History     Socioeconomic History     Marital status: Single     Spouse name: Not on file     Number of children: Not on file     Years of education: Not on file     Highest education level: Not on file   Occupational History     Not on file   Tobacco Use     Smoking status: Former Smoker     Smokeless tobacco: Never Used   Substance and Sexual Activity     Alcohol use: Yes     Drug use: No     Sexual activity: Not on file   Other Topics Concern     Not on file   Social History Narrative    ** Merged History Encounter **         Preload  7/16/2013     Social Determinants of Health     Financial Resource Strain: Not on file   Food Insecurity: Not on file   Transportation Needs: Not on file   Physical Activity: Not on file   Stress: Not on file   Social Connections: Not on file   Intimate Partner Violence: Not on file   Housing Stability:  "Not on file       ROS: 10-Point ROS negative except as noted in HPI    Physical Exam  BP (!) 145/92   Pulse 80   Ht 1.702 m (5' 7\")   Wt 87.5 kg (193 lb)   BMI 30.23 kg/m    Gen: Well-appearing, NAD  CV:  Warm and well perfused   Pulm: Breathing comfortably on room air   Abd: Soft, non-distended, mild RLQ tenderness to deep palpation   Ext: No LE edema, extremities warm and well perfused    Pelvic:  Deferred     Assessment/Plan:  Van Ochoa is a 32 year old G0 trans man (he/him) who presents for evaluation of reported ovarian cysts and fibroids on recent US. Unfortunately, I am unable to view report or images from this US today. We also discussed fertility and possible laboratory evaluation if he is interested in a future pregnancy.     # Pelvic pain  # Menorrhagia   # Reported ovarian cyst, fibroid uterus  - Pelvic US ordered to evaluate for structural causes of AUB and R sided pelvic pain   - Discussed management options for fibroids briefly including progesterone, IUD, surgical management with myomectomy vs hysterectomy  - Ultimately, he is interested in surgical management with hysterectomy but is still deciding whether he would want to carry a pregnancy in the future   - Will need to discuss route as well as risks/benefits of leaving ovaries for hormones at time of surgery should he decide to proceed with hysterectomy     # Fertility   - Symptoms of hot flashes and night sweats concerning for possible early menopause, also asked to disclose to PCP to evaluate for other etiology such as thyroid disorder   - Discussed that we could start fertility evaluation with labs if he's interested, he will consider and message if he would like lab work   - FSH, LH, D3 estradiol, AMH, prolactin, TSH, and D21 progesterone if interested  - Pelvic US as above will also assess for structural causes of subfertility   - Discussed would need to be off of testosterone prior to pregnancy in future   - Recommended evaluation with " PRO specialist in future if interested in fertility, pt reports this would likely be cost prohibitive for him and his partner      # Routine health maintenance  - Up to date on pap smear  - Declines STI screening today, recently done   - S/p gender affirming mastectomy, will review breast self awareness at future visit   - Contraception: reviewed that testosterone is not a contraceptive and if having intercourse with partners who have penis/semen should use barrier method (also for STI prevention)     Return to clinic after pelvic US.     Staffed with Dr. Broussard.     Justina Katz MD  OB/GYN Resident PGY-2  5:58 PM June 1, 2022       The Patient was seen in Resident Continuity Clinic by JUSTINA KATZ.  I reviewed the history & exam. Assessment and plan were jointly made.    Sydnie Broussard MD

## 2022-06-22 ENCOUNTER — ANCILLARY PROCEDURE (OUTPATIENT)
Dept: ULTRASOUND IMAGING | Facility: CLINIC | Age: 32
End: 2022-06-22
Payer: COMMERCIAL

## 2022-06-22 DIAGNOSIS — N93.9 ABNORMAL UTERINE BLEEDING: ICD-10-CM

## 2022-06-22 PROCEDURE — 76830 TRANSVAGINAL US NON-OB: CPT

## 2022-06-22 PROCEDURE — 76830 TRANSVAGINAL US NON-OB: CPT | Mod: 26 | Performed by: OBSTETRICS & GYNECOLOGY

## 2022-08-04 ENCOUNTER — OFFICE VISIT (OUTPATIENT)
Dept: OBGYN | Facility: CLINIC | Age: 32
End: 2022-08-04
Payer: COMMERCIAL

## 2022-08-04 VITALS
HEART RATE: 75 BPM | BODY MASS INDEX: 29.66 KG/M2 | WEIGHT: 189 LBS | DIASTOLIC BLOOD PRESSURE: 92 MMHG | HEIGHT: 67 IN | SYSTOLIC BLOOD PRESSURE: 136 MMHG

## 2022-08-04 DIAGNOSIS — F64.9 GENDER DYSPHORIA: ICD-10-CM

## 2022-08-04 DIAGNOSIS — N93.9 ABNORMAL UTERINE BLEEDING: Primary | ICD-10-CM

## 2022-08-04 PROCEDURE — 99213 OFFICE O/P EST LOW 20 MIN: CPT | Mod: KX | Performed by: OBSTETRICS & GYNECOLOGY

## 2022-08-04 PROCEDURE — G0463 HOSPITAL OUTPT CLINIC VISIT: HCPCS

## 2022-08-04 NOTE — PROGRESS NOTES
"[facility]  8/4/2022    Reason For Visit: Review US results, discuss hysterectomy     S: Van Ochoa is a 32 year old G0 trans man here for follow up from pelvic US and to discuss hysterectomy. He reports that since our last visit on 6/1 he has started taking testosterone again and has stopped having any bleeding. Prior to restarting, was having heavy bleeding that lasted weeks at a time. Dysphoria has improved since starting T. Denies any abdominal pain, changes in bowel or bladder function, or other concerns at this time. Interested in hysterectomy as treatment for AUB and gender dysphoria.     OBGYN Hx  Menses: as above  Sexually active: yes, with female and male partners  History of STIs: none, recently tested  Pap Smears: 9/2020 NILM, HPV neg  History of abnormal paps: none  Contraception: condoms      O:   BP (!) 136/92   Pulse 75   Ht 1.702 m (5' 7\")   Wt 85.7 kg (189 lb)   BMI 29.60 kg/m    Exam:  Gen: Well appearing, NAD  CV: Warm and well perfused   Resp: On room air, no increased work of breathing  : Deferred    Studies:   Pelvic US 6/22/22  Gynecological Ultrasonography:   Uterus: mid-position. Contour is irregular w/ myomata: 1 Right Anterior  3.75 x 3.64 x 3.54 cm.  Size: 5.19 x 4.94 x 4.35 cm  Endometrium: Thickness Total 5.5 mm  Findings:   Right Ovary: 3.49 x 3.22 x 1.42 cm. Wnl  Left Ovary: 3.45 x 2.82 x 1.79 cm. Wnl  Cul de Sac Free Fluid: No free fluid     Impression: One uterine myoma with measurements as noted.     A/P: 32 year old year old G0 trans man (he/him) here for follow up review of pelvic US and for discussion of hysterectomy.     # AUB  # Gender dysphoria  Reviewed pelvic US with small intramural fibroid, would not expect this to cause significant symptoms, no evidence of ovarian cysts at this time.   - Bleeding improved since starting T   - Interested in hysterectomy for gender dysphoria and AUB treatment. Reviewed procedure details, risks, and approach. Would likely be " appropriate for [facility]. We discussed that unless it is causing significant gender dysphoria, would recommend leaving ovaries in situ for beneficial hormones. He is comfortable with this plan.  - Pre-op visit with anesthesia given h/o HTN, asthma, GERD  - Surgical request placed today     Patient discussed with Dr. Hilario.        Jovita Katz MD  OB/GYN Resident PGY-3  5:49 PM August 4, 2022      The Patient was seen in Resident Continuity Clinic by JOVITA KATZ.  I reviewed the history & exam. Assessment and plan were jointly made.    Angely Hilario MD

## 2022-08-15 ENCOUNTER — TELEPHONE (OUTPATIENT)
Dept: OBGYN | Facility: CLINIC | Age: 32
End: 2022-08-15

## 2022-08-15 NOTE — TELEPHONE ENCOUNTER
Left message to call back on cell. Also tried calling home phone #, rang a couple times then said call could not be completed.    Bárbara Huerta  Clinical Services Assistant

## 2022-08-16 ENCOUNTER — TELEPHONE (OUTPATIENT)
Dept: OBGYN | Facility: CLINIC | Age: 32
End: 2022-08-16

## 2022-08-16 NOTE — TELEPHONE ENCOUNTER
Left message for patient to call back on mobile phone number. Also called home phone number, call rings 3 times and then says call cannot be completed.    Bárbara Huerta  Clinical Services Assistant

## 2022-09-12 ENCOUNTER — TELEPHONE (OUTPATIENT)
Dept: OBGYN | Facility: CLINIC | Age: 32
End: 2022-09-12

## 2022-09-12 NOTE — TELEPHONE ENCOUNTER
Called patient to schedule surgery, no answer and voicemail is not set up yet, unable to leave message.    Bárbara Huerta  Clinical Services Assistant

## 2022-09-28 PROBLEM — N93.9 ABNORMAL UTERINE BLEEDING: Status: ACTIVE | Noted: 2022-09-28

## 2022-11-11 PROBLEM — K76.0 FATTY LIVER: Status: ACTIVE | Noted: 2018-05-31

## 2022-11-11 PROBLEM — G89.29 CHRONIC BILATERAL LOW BACK PAIN WITH BILATERAL SCIATICA: Status: ACTIVE | Noted: 2019-04-25

## 2022-11-11 PROBLEM — R79.89 SERUM CREATININE RAISED: Status: ACTIVE | Noted: 2017-03-21

## 2022-11-11 PROBLEM — M54.42 CHRONIC BILATERAL LOW BACK PAIN WITH BILATERAL SCIATICA: Status: ACTIVE | Noted: 2019-04-25

## 2022-11-11 PROBLEM — E66.9 OBESITY: Status: ACTIVE | Noted: 2019-03-18

## 2022-11-11 PROBLEM — Z91.09 ENVIRONMENTAL ALLERGIES: Status: ACTIVE | Noted: 2021-04-15

## 2022-11-11 PROBLEM — M54.41 CHRONIC BILATERAL LOW BACK PAIN WITH BILATERAL SCIATICA: Status: ACTIVE | Noted: 2019-04-25

## 2022-11-11 PROBLEM — J45.909 ASTHMA: Status: ACTIVE | Noted: 2021-04-15

## 2022-11-11 PROBLEM — D75.1 POLYCYTHEMIA: Status: ACTIVE | Noted: 2017-03-21

## 2022-11-11 PROBLEM — E03.9 HYPOTHYROIDISM: Status: ACTIVE | Noted: 2017-01-05

## 2022-11-11 PROBLEM — F64.0 GENDER DYSPHORIA IN ADULT: Status: ACTIVE | Noted: 2017-02-02

## 2022-11-17 ENCOUNTER — TELEPHONE (OUTPATIENT)
Dept: OBGYN | Facility: CLINIC | Age: 32
End: 2022-11-17

## 2022-11-17 NOTE — TELEPHONE ENCOUNTER
Called patient to discuss missing H&P, no answer and voicemail box is full so unable to leave message.    Bárbara Huerta  Clinical Services Assistant

## 2022-11-21 ENCOUNTER — TELEPHONE (OUTPATIENT)
Dept: OBGYN | Facility: CLINIC | Age: 32
End: 2022-11-21

## 2022-11-21 RX ORDER — SODIUM CHLORIDE, SODIUM LACTATE, POTASSIUM CHLORIDE, CALCIUM CHLORIDE 600; 310; 30; 20 MG/100ML; MG/100ML; MG/100ML; MG/100ML
INJECTION, SOLUTION INTRAVENOUS CONTINUOUS
Status: CANCELLED | OUTPATIENT
Start: 2022-11-21

## 2022-11-21 RX ORDER — FENTANYL CITRATE 50 UG/ML
50 INJECTION, SOLUTION INTRAMUSCULAR; INTRAVENOUS EVERY 5 MIN PRN
Status: CANCELLED | OUTPATIENT
Start: 2022-11-21

## 2022-11-21 RX ORDER — ONDANSETRON 4 MG/1
4 TABLET, ORALLY DISINTEGRATING ORAL EVERY 30 MIN PRN
Status: CANCELLED | OUTPATIENT
Start: 2022-11-21

## 2022-11-21 RX ORDER — LIDOCAINE 40 MG/G
CREAM TOPICAL
Status: CANCELLED | OUTPATIENT
Start: 2022-11-21

## 2022-11-21 RX ORDER — GABAPENTIN 300 MG/1
300 CAPSULE ORAL
Status: CANCELLED | OUTPATIENT
Start: 2022-11-21

## 2022-11-21 RX ORDER — FENTANYL CITRATE 50 UG/ML
25 INJECTION, SOLUTION INTRAMUSCULAR; INTRAVENOUS
Status: CANCELLED | OUTPATIENT
Start: 2022-11-21

## 2022-11-21 RX ORDER — HYDROMORPHONE HYDROCHLORIDE 1 MG/ML
0.2 INJECTION, SOLUTION INTRAMUSCULAR; INTRAVENOUS; SUBCUTANEOUS EVERY 5 MIN PRN
Status: CANCELLED | OUTPATIENT
Start: 2022-11-21

## 2022-11-21 RX ORDER — MEPERIDINE HYDROCHLORIDE 25 MG/ML
12.5 INJECTION INTRAMUSCULAR; INTRAVENOUS; SUBCUTANEOUS
Status: CANCELLED | OUTPATIENT
Start: 2022-11-21

## 2022-11-21 RX ORDER — HYDROMORPHONE HYDROCHLORIDE 1 MG/ML
0.4 INJECTION, SOLUTION INTRAMUSCULAR; INTRAVENOUS; SUBCUTANEOUS EVERY 5 MIN PRN
Status: CANCELLED | OUTPATIENT
Start: 2022-11-21

## 2022-11-21 RX ORDER — ONDANSETRON 2 MG/ML
4 INJECTION INTRAMUSCULAR; INTRAVENOUS EVERY 30 MIN PRN
Status: CANCELLED | OUTPATIENT
Start: 2022-11-21

## 2022-11-21 RX ORDER — ACETAMINOPHEN 325 MG/1
975 TABLET ORAL ONCE
Status: CANCELLED | OUTPATIENT
Start: 2022-11-21 | End: 2022-11-21

## 2022-11-21 RX ORDER — FENTANYL CITRATE 50 UG/ML
25 INJECTION, SOLUTION INTRAMUSCULAR; INTRAVENOUS EVERY 5 MIN PRN
Status: CANCELLED | OUTPATIENT
Start: 2022-11-21

## 2022-11-21 NOTE — TELEPHONE ENCOUNTER
Patient has been unreachable via phone, called patient in regards to procedure scheduled for tomorrow 11/22. No answer and voicemail box is full. Patient has been unreachable by writer and pre-op RN team.    Bárbara Huerta  Clinical Services Assistant

## 2022-11-22 ENCOUNTER — HOSPITAL ENCOUNTER (OUTPATIENT)
Facility: AMBULATORY SURGERY CENTER | Age: 32
Discharge: HOME | End: 2022-11-22
Attending: OBSTETRICS & GYNECOLOGY

## 2022-11-22 DIAGNOSIS — N93.9 ABNORMAL UTERINE BLEEDING: ICD-10-CM

## 2025-01-25 ENCOUNTER — HOSPITAL ENCOUNTER (EMERGENCY)
Facility: CLINIC | Age: 35
Discharge: HOME | End: 2025-01-26
Attending: STUDENT IN AN ORGANIZED HEALTH CARE EDUCATION/TRAINING PROGRAM | Admitting: STUDENT IN AN ORGANIZED HEALTH CARE EDUCATION/TRAINING PROGRAM
Payer: COMMERCIAL

## 2025-01-25 ENCOUNTER — TELEPHONE (OUTPATIENT)
Dept: BEHAVIORAL HEALTH | Facility: CLINIC | Age: 35
End: 2025-01-25

## 2025-01-25 DIAGNOSIS — F23 ACUTE PSYCHOSIS (H): ICD-10-CM

## 2025-01-25 DIAGNOSIS — F51.05 INSOMNIA DUE TO OTHER MENTAL DISORDER: ICD-10-CM

## 2025-01-25 DIAGNOSIS — F99 INSOMNIA DUE TO OTHER MENTAL DISORDER: ICD-10-CM

## 2025-01-25 PROBLEM — F84.0 AUTISM SPECTRUM DISORDER: Status: ACTIVE | Noted: 2025-01-25

## 2025-01-25 PROBLEM — F29 PSYCHOSIS (H): Status: ACTIVE | Noted: 2025-01-25

## 2025-01-25 LAB
AMPHETAMINES UR QL SCN: ABNORMAL
BARBITURATES UR QL SCN: ABNORMAL
BENZODIAZ UR QL SCN: ABNORMAL
BZE UR QL SCN: ABNORMAL
CANNABINOIDS UR QL SCN: ABNORMAL
FENTANYL UR QL: ABNORMAL
GLUCOSE BLDC GLUCOMTR-MCNC: 140 MG/DL (ref 70–99)
OPIATES UR QL SCN: ABNORMAL
PCP QUAL URINE (ROCHE): ABNORMAL

## 2025-01-25 PROCEDURE — 250N000013 HC RX MED GY IP 250 OP 250 PS 637: Performed by: EMERGENCY MEDICINE

## 2025-01-25 PROCEDURE — 82962 GLUCOSE BLOOD TEST: CPT

## 2025-01-25 PROCEDURE — 250N000013 HC RX MED GY IP 250 OP 250 PS 637: Performed by: STUDENT IN AN ORGANIZED HEALTH CARE EDUCATION/TRAINING PROGRAM

## 2025-01-25 PROCEDURE — 80307 DRUG TEST PRSMV CHEM ANLYZR: CPT | Performed by: STUDENT IN AN ORGANIZED HEALTH CARE EDUCATION/TRAINING PROGRAM

## 2025-01-25 PROCEDURE — 99285 EMERGENCY DEPT VISIT HI MDM: CPT

## 2025-01-25 RX ORDER — LURASIDONE HYDROCHLORIDE 20 MG/1
20 TABLET, FILM COATED ORAL
Status: ON HOLD | COMMUNITY

## 2025-01-25 RX ORDER — TESTOSTERONE CYPIONATE 200 MG/ML
0.35 INJECTION, SOLUTION INTRAMUSCULAR
Status: DISCONTINUED | OUTPATIENT
Start: 2025-01-30 | End: 2025-01-26 | Stop reason: HOSPADM

## 2025-01-25 RX ORDER — PANTOPRAZOLE SODIUM 40 MG/1
40 TABLET, DELAYED RELEASE ORAL DAILY
Status: DISCONTINUED | OUTPATIENT
Start: 2025-01-25 | End: 2025-01-26 | Stop reason: HOSPADM

## 2025-01-25 RX ORDER — AMLODIPINE BESYLATE 5 MG/1
5 TABLET ORAL 2 TIMES DAILY
Status: ON HOLD | COMMUNITY

## 2025-01-25 RX ORDER — LOSARTAN POTASSIUM 25 MG/1
25 TABLET ORAL DAILY
Status: ON HOLD | COMMUNITY

## 2025-01-25 RX ORDER — DULOXETIN HYDROCHLORIDE 30 MG/1
30 CAPSULE, DELAYED RELEASE ORAL DAILY
Status: DISCONTINUED | OUTPATIENT
Start: 2025-01-25 | End: 2025-01-26 | Stop reason: HOSPADM

## 2025-01-25 RX ORDER — PANTOPRAZOLE SODIUM 20 MG/1
40 TABLET, DELAYED RELEASE ORAL DAILY
Status: ON HOLD | COMMUNITY

## 2025-01-25 RX ORDER — DULOXETIN HYDROCHLORIDE 30 MG/1
30 CAPSULE, DELAYED RELEASE ORAL DAILY
Status: ON HOLD | COMMUNITY

## 2025-01-25 RX ORDER — LURASIDONE HYDROCHLORIDE 20 MG/1
20 TABLET, FILM COATED ORAL DAILY
Status: DISCONTINUED | OUTPATIENT
Start: 2025-01-25 | End: 2025-01-26 | Stop reason: HOSPADM

## 2025-01-25 RX ORDER — AMLODIPINE BESYLATE 5 MG/1
5 TABLET ORAL 2 TIMES DAILY
Status: DISCONTINUED | OUTPATIENT
Start: 2025-01-25 | End: 2025-01-26 | Stop reason: HOSPADM

## 2025-01-25 RX ORDER — LABETALOL 100 MG/1
100 TABLET, FILM COATED ORAL 2 TIMES DAILY
Status: ON HOLD | COMMUNITY

## 2025-01-25 RX ORDER — LEVOTHYROXINE SODIUM 100 UG/1
100 TABLET ORAL DAILY
Status: ON HOLD | COMMUNITY

## 2025-01-25 RX ORDER — FAMOTIDINE 20 MG/1
20 TABLET, FILM COATED ORAL DAILY
Status: ON HOLD | COMMUNITY

## 2025-01-25 RX ORDER — OLANZAPINE 10 MG/1
10 TABLET, ORALLY DISINTEGRATING ORAL 2 TIMES DAILY PRN
Status: DISCONTINUED | OUTPATIENT
Start: 2025-01-25 | End: 2025-01-26 | Stop reason: HOSPADM

## 2025-01-25 RX ORDER — CETIRIZINE HYDROCHLORIDE 10 MG/1
10 TABLET ORAL DAILY PRN
Status: DISCONTINUED | OUTPATIENT
Start: 2025-01-25 | End: 2025-01-26 | Stop reason: HOSPADM

## 2025-01-25 RX ORDER — ESCITALOPRAM OXALATE 10 MG/1
20 TABLET ORAL DAILY
Status: DISCONTINUED | OUTPATIENT
Start: 2025-01-25 | End: 2025-01-26 | Stop reason: HOSPADM

## 2025-01-25 RX ORDER — BUPROPION HYDROCHLORIDE 150 MG/1
150 TABLET ORAL EVERY MORNING
Status: ON HOLD | COMMUNITY

## 2025-01-25 RX ORDER — FAMOTIDINE 20 MG/1
20 TABLET, FILM COATED ORAL DAILY
Status: DISCONTINUED | OUTPATIENT
Start: 2025-01-25 | End: 2025-01-26 | Stop reason: HOSPADM

## 2025-01-25 RX ORDER — LOSARTAN POTASSIUM 25 MG/1
25 TABLET ORAL DAILY
Status: DISCONTINUED | OUTPATIENT
Start: 2025-01-25 | End: 2025-01-26 | Stop reason: HOSPADM

## 2025-01-25 RX ORDER — BUPROPION HYDROCHLORIDE 150 MG/1
150 TABLET ORAL EVERY MORNING
Status: DISCONTINUED | OUTPATIENT
Start: 2025-01-26 | End: 2025-01-26 | Stop reason: HOSPADM

## 2025-01-25 RX ORDER — LEVOTHYROXINE SODIUM 100 UG/1
100 TABLET ORAL DAILY
Status: DISCONTINUED | OUTPATIENT
Start: 2025-01-25 | End: 2025-01-26 | Stop reason: HOSPADM

## 2025-01-25 RX ORDER — LABETALOL 100 MG/1
100 TABLET, FILM COATED ORAL 2 TIMES DAILY
Status: DISCONTINUED | OUTPATIENT
Start: 2025-01-25 | End: 2025-01-26 | Stop reason: HOSPADM

## 2025-01-25 RX ADMIN — PANTOPRAZOLE SODIUM 40 MG: 40 TABLET, DELAYED RELEASE ORAL at 18:02

## 2025-01-25 RX ADMIN — OLANZAPINE 10 MG: 10 TABLET, ORALLY DISINTEGRATING ORAL at 16:03

## 2025-01-25 RX ADMIN — ESCITALOPRAM OXALATE 20 MG: 10 TABLET ORAL at 18:02

## 2025-01-25 RX ADMIN — FAMOTIDINE 20 MG: 20 TABLET, FILM COATED ORAL at 18:02

## 2025-01-25 RX ADMIN — LURASIDONE HYDROCHLORIDE 20 MG: 20 TABLET, COATED ORAL at 18:02

## 2025-01-25 ASSESSMENT — ACTIVITIES OF DAILY LIVING (ADL)
ADLS_ACUITY_SCORE: 41

## 2025-01-25 NOTE — ED PROVIDER NOTES
Emergency Department Note      History of Present Illness     Chief Complaint   Insomnia      HPI   Van Ochoa is a 35 year old adult patient, brought in by his boyfriend for concerns of 4 days of insomnia.  However, now he is having difficulty time staying awake because he is so sleepy.  He has not fallen or hit his head.  No vomiting or fever.  Patient's boyfriend states that he has been intermittently nonverbal which is an acute on chronic issue.    Independent Historian   Boyfriend assists with history and is requesting empath.    Review of External Notes   none    Past Medical History     Medical History and Problem List   Past Medical History:   Diagnosis Date    ADHD     Anxiety     Asthma     Depression     Environmental allergies     Gastroesophageal reflux disease     Gender dysphoria in adult     Hypertension     Hypothyroid     Polycythemia        Medications   AmLODIPine Besylate (NORVASC PO)  amphetamine-dextroamphetamine (ADDERALL XR) 20 MG per capsule  cetirizine (ZYRTEC) 10 MG tablet  Escitalopram Oxalate (LEXAPRO PO)  fluticasone (FLONASE) 50 MCG/ACT spray  HYDROCHLOROTHIAZIDE PO  HYDROcodone-acetaminophen (NORCO) 5-325 MG per tablet  labetalol (NORMODYNE) 100 MG tablet  Levothyroxine Sodium (SYNTHROID PO)  loratadine (CLARITIN) 10 MG tablet  lorazepam (ATIVAN) 0.5 MG tablet  Melatonin 1 MG CAPS  Metoprolol Succinate (TOPROL XL PO)  Multiple Vitamin (ONE-A-DAY ESSENTIAL) TABS  Omeprazole (PRILOSEC PO)  testosterone cypionate (DEPOTESTOTERONE) 200 MG/ML injection  triamcinolone (KENALOG) 0.1 % lotion        Surgical History   Past Surgical History:   Procedure Laterality Date    ENT SURGERY      Tonsillectomy    IR LUMBAR PUNCTURE  8/30/2018    IR LUMBAR PUNCTURE  8/7/2018    TRANSGENDER MASTECTOMY Bilateral 7/7/2017    Procedure: TRANSGENDER MASTECTOMY;  BILATERAL MASTECTOMY ;  Surgeon: Daphne Ibrahim MD;  Location: [facility]       Physical Exam     Patient Vitals for the past 24 hrs:   BP  Temp Temp src Pulse Resp SpO2   01/25/25 1337 130/88 97  F (36.1  C) Temporal 88 16 99 %     Physical Exam  GENERAL: Patient is sleeping in the chair with eyes closed but responds to verbal stimuli.  He will say his name, and his boyfriend's, but will not participate in the exam.  HEAD: Atraumatic.  NECK: No rigidity  CV: RRR, no murmurs, rubs or gallops  PULM: CTAB with good aeration; no retractions, rales, rhonchi, or wheezing  ABD: Soft, nontender, nondistended, no guarding  DERM: No rash. Skin warm and dry  EXTREMITY: Moving all extremities without difficulty.        Diagnostics     Lab Results   Labs Ordered and Resulted from Time of ED Arrival to Time of ED Departure   GLUCOSE BY METER - Abnormal       Result Value    GLUCOSE BY METER POCT 140 (*)    GLUCOSE MONITOR NURSING POCT       Imaging   No orders to display          ED Course      Medications Administered   Medications   OLANZapine zydis (zyPREXA) ODT tab 10 mg (10 mg Oral $Given 1/25/25 2205)       Procedures   Procedures     Discussion of Management   DEC    ED Course        Additional Documentation  None    Medical Decision Making / Diagnosis     CMS Diagnoses: None    MIPS       None    MDM   Van Ochoa is a 35 year old adult     Brought in by boyfriend for insomnia for 4 days.  Initial assessment, he is very fatigued.  When I reassessed him and behavior health room, he is sitting up and watching [employer]o and giggling.  Still limited historian. Per boyfriend, may have smoked marijuana prior to arrival.    DEC further discussed with patient's boyfriend.  Patient has not been taking care of himself.  He is intermittently compliant with his medications.  He has been under a lot of added stress.  Symptoms are ultimately similar prior to prior episode of acute psychosis.  Ultimately, plan will be for inpatient psych due to inability to care for self.    His blood sugar is not significant elevated.  No reported head trauma.  Do not think we need  brain imaging.    Patient will board in the ED pending inpatient psych evaluation.    Patient voluntary but holdable.    Disposition   Pending inpatient psych.     Diagnosis     ICD-10-CM    1. Insomnia due to other mental disorder  F51.05     F99       2. Acute psychosis (H)  F23            Discharge Medications   New Prescriptions    No medications on file         MD Sparkle Martínez Kevin, MD  01/25/25 1207

## 2025-01-25 NOTE — ED NOTES
[facility]  ED to EMPATH Checklist:      Goal for EMPATH:  Referral and resources and Time to stabilize    Current Behavior: Sleeping    Safety Concerns: None    Legal Hold Status: Voluntary    Medically Cleared by ED provider: Yes    Patient Therapeutically Searched: Therapeutic search by ED staff (strings, belts, shoes, pockets, electronics, etc.)    Belongings: Remain with patient    Independent Ambulation at Baseline: Yes/No: Yes    Participates in Care/Conversation: Yes/No: Yes    Patient Informed about EMPATH: Yes/No: Yes    DEC: Ordered and pending    Patient Ready to be Transferred to EMPATH? Yes/No: Yes

## 2025-01-25 NOTE — ED NOTES
IP MH Referral Acuity Rating Score (RARS)    LMHP complete at referral to IP MH, with DEC; and, daily while awaiting IP MH placement. Call score to PPS.  CRITERIA SCORING   New 70 HH and Involuntary for IP MH (not adolescent) 0/1   Boarding over 24 hours 0/1   Vulnerable adult at least 55+ with multiple co morbidities; or, Patient age 11 or under 0/1   Suicide ideation without relief of precipitating factors 0/1   Current plan for suicide 0/1   Current plan for homicide 0/1   Imminent risk or actual attempt to seriously harm another without relief of factors precipitating the attempt 0/1   Severe dysfunction in daily living (ex: complete neglect for self care, extreme disruption in vegetative function, extreme deterioration in social interactions) 1/1   Recent (last 2 weeks) or current physical aggression in the ED 0/1   Restraints or seclusion episode in ED 0/1   Verbal aggression, agitation, yelling, etc., while in the ED 0/1   Active psychosis with psychomotor agitation or catatonia 1/1   Need for constant or near constant redirection (from leaving, from others, etc).  0/1   Intrusive or disruptive behaviors 0/1   TOTAL Acuity Total Score: 2     MARIANA Hogue

## 2025-01-25 NOTE — PLAN OF CARE
Van Ochoa  January 25, 2025  Plan of Care Hand-off Note     Patient Recommended Care Path: inpatient mental health    Clinical Substantiation:  It is the recommendation of this writer that pt be admitted to an inpatient psychiatric unit as he is currently unable to care for himself in the community. He has not slept in 4 days, has not been showering or preparing his food, and has needed reminders to eat, drink, and use the bathroom. He is non-verbal during his assessment with this writer and does not appear to track the conversation that this writer has with his partner despite being in the room during it. However, when his partner asks if he would like to stay in the hospital, he does nod yes. He has only been remembering to take his psychiatric medication 4 out of 7 days at most.    Goals for crisis stabilization:  increase sleep, increase coherence    Next steps for Care Team:  continue to monitor pt for possible signs of catatonia    Treatment Objectives Addressed:  processing feelings, assessing safety    Therapeutic Interventions:  Engaged in guided discovery, explored patient's perspectives and helped expand them through socratic dialogue.    Has a specific means been identified for suicidal.homicide actions: No     Patient coping skills attempted to reduce the crisis:  Pt has been seeking out support from his partners.    Severe psychiatric, behavioral or other comorbid conditions are appropriate for management at inpatient mental health as indicated by at least one of the following: Psychiatric Symptoms, Impaired impulse control, judgement, or insight, Symptoms of impact to function  Severe dysfunction in daily living is present as indicated by at least one of the following: Incapacitation because of grave disability, Extreme deterioration in social interactions, Extreme disruption in vegetative function, Complete inability to maintain any appropriate aspect of personal responsibility in any adult  roles  Situation and expectations are appropriate for inpatient care: Around-the-clock medical and nursing care to address symptoms and initiate intervention is required, Voluntary treatment at lower level of care is not feasible, Patient management/treatment at lower level of care is not feasible or is inappropriate  Inpatient mental health services are necessary to meet patient needs and at least one of the following: Specific condition related to admission diagnosis is present and judged likely to deteriorate in absence of treatment at proposed level of care      Collateral contact information:    Jimmy Kim, partner, PH: [phone]  Blanka Brito, partner, PH: [phone]    Legal Status: Voluntary/Patient has signed consent for treatment                                                   Reviewed court records: yes     Psychiatry Consult: Patient has Psychiatry Consult Order    Kamla Campbell, LICSW

## 2025-01-25 NOTE — ED TRIAGE NOTES
Pt comes in today due to 4 days of insomnia   Pt is having a hard time taking care of himself

## 2025-01-25 NOTE — CONSULTS
Diagnostic Evaluation Consultation  Crisis Assessment    Patient Name: Van Ochoa  Age:  35 year old  Legal Sex: male  Gender Identity: male  Race: White  Ethnicity: Not  or   Language: English      Patient was assessed: In person   Crisis Assessment Start Date: 01/25/25  Crisis Assessment Start Time: 1520  Crisis Assessment Stop Time: 1550  Patient location: [facility] Emergency Dept                             [id number]    Referral Data and Chief Complaint  Van Ochoa presents to the ED with family/friends (with partner). Patient is presenting to the ED for the following concerns: Worsening psychosocial stress, Significant behavioral change. Factors that make the mental health crisis life threatening or complex are: Pt presents to the ED with his partner after [facility] directed them to the hospital. Upon assessment, pt is largely non-verbal and laughs spontaneously at times. At one point, he takes off his shirt, stretches in a yoga position on his bed, and needs directions to move back into a seated position. He is unable to engage in a back-and-forth conversation with this writer about his mental health at this point in time. Per collateral report from pt's partner, his mental health began to deteriorate approximately 4 days ago following the presidential inauguration. As a transgender man, pt has expressed significant fear and concern about what the change in president may mean for him. He has not slept in 4 days and moves rapdily from laughing to sobbing. He has been unable to shower or prepare food for himself. He needs to be directed to use the bathroom, eat, or drink. He has not been reporting SI or HI. He uses marijuana daily to treat his fibromyalgia, which has recently been more painful in the cold weather. He has an established [facility] worker and is in between psychiatrists at present. He typically only remembers to take his psychiatric medication at most 4 out of 7 days a  week..      Informed Consent and Assessment Methods  Explained the crisis assessment process, including applicable information disclosures and limits to confidentiality, assessed understanding of the process, and obtained consent to proceed with the assessment.  Assessment methods included conducting a formal interview with patient, review of medical records, collaboration with medical staff, and obtaining relevant collateral information from family and community providers when available.  : done     History of the Crisis   Per collateral report from pt's partner, he carries diagnoses of anxiety, depression, ASD, and ADHD and has a history of suicide attempts. Chart review indicates that pt was hospitalized at [facility] from 4/20-4/23/22 where he was given diagnoses of unspecified psychosis and a rule-out diagnosis of bipolar II. He has no history of commitment.    Brief Psychosocial History  Family:  Domestic Partnership (Pt lives with his two partners Jimmy and Blanka.), Children no  Support System:  Partner  Employment Status:  disabled  Source of Income:  disability  Financial Environmental Concerns:  none  Current Hobbies:  television/movies/videos  Barriers in Personal Life:  mental health concerns    Significant Clinical History  Current Anxiety Symptoms:  racing thoughts, anxious  Current Depression/Trauma:  sense of doom  Current Somatic Symptoms:  racing thoughts, anxious  Current Psychosis/Thought Disturbance:     Current Eating Symptoms:     Chemical Use History:  Alcohol: None  Benzodiazepines: None  Opiates: None  Cocaine: None  Marijuana: Daily  Last Use::  (to self-medicate fibromyalgia)  Other Use: None   Past diagnosis:  ADHD, Autism, Other (unspecified psychosis)  Family history:  No known history of mental health or chemical health concerns  Past treatment:  Psychiatric Medication Management, Inpatient Hospitalization, Primary Care, [facility]  Details of most recent treatment:  Pt has an established  [facility] worker. His [facility] worker is referring him to new psychiatrists.  Other relevant history:  No other relevant history.    Have there been any medication changes in the past two weeks:  no       Is the patient compliant with medications:  no  Per collateral report, pt only remembers to take his medications approximately 4 out of 7 days a week even when he is functioning well.     Collateral Information  Is there collateral information: Yes     Collateral information name, relationship, phone number:  Jimmy Kim, partner, PH: [phone]    What happened today: Jimmy called [facility] who directed him to bring pt to the ED.     What is different about patient's functioning: As a transgender man, pt has been very fearful about the change in presidents. Following the presidential inauguration, pt's mental health began to deteriorate. He has not slept in 4 days and has not been able to shower or prepare food for himself. He needs to be reminded to eat, drink, and go to the bathroom. He has not been reporting SI or HI. Pt is often non-verbal when highly anxious as part of his ASD; however, even during these times, he is still able to sleep and care for himself. Pt historically has difficulty remembering to take his psychiatric medication and takes his medication 4 out of 7 days a week at most.     What do you think the patient needs: Pt needs to remain in the hospital to get sleep.      Has patient made comments about wanting to kill themselves/others: no    If d/c is recommended, can they take part in safety/aftercare planning:  yes    Additional collateral information:  No additional collateral information.     Risk Assessment  [place] Suicide Severity Rating Scale Full Clinical Version: 1/25/25  Suicidal Ideation  Q1 Wish to be Dead (Lifetime): Yes  Q2 Non-Specific Active Suicidal Thoughts (Lifetime): Yes  3. Active Suicidal Ideation with any Methods (Not Plan) Without Intent to Act (Lifetime): Yes  4. Active Suicidal  Ideation with Some Intent to Act, Without Specific Plan (Lifetime): Yes  5. Active Suicidal Ideation with Specific Plan and Intent (Lifetime): Yes  Q6 Suicide Behavior (Lifetime): yes  Intensity of Ideation (Lifetime)  Most Severe Ideation Rating (Lifetime): 5  Frequency (Lifetime): Many times each day  Duration (Lifetime): More than 8 hours/persistent or continuous  Controllability (Lifetime): Unable to control thoughts  Deterrents (Lifetime): Deterrents definitely did not stop you  Reasons for Ideation (Lifetime): Completely to end or stop the pain (You couldn't go on living with the pain or how you were feeling)  Suicidal Behavior (Lifetime)  Actual Attempt (Lifetime): Yes  Total Number of Actual Attempts (Lifetime):  (multiple)  Actual Attempt Description (Lifetime): Per collateral report, pt has a history of multiple suicide attempts.  Has subject engaged in non-suicidal self-injurious behavior? (Lifetime): Yes (hx of NSSI noted in chart)  Interrupted Attempts (Lifetime): No  Aborted or Self-Interrupted Attempt (Lifetime): No  Preparatory Acts or Behavior (Lifetime): No    [place] Suicide Severity Rating Scale Recent: 1/25/25  Suicidal Ideation (Recent)  Q1 Wished to be Dead (Past Month): no  Q2 Suicidal Thoughts (Past Month): no  Level of Risk per Screen: no risks indicated  Intensity of Ideation (Recent)  Most Severe Ideation Rating (Past 1 Month):  (0)  Frequency (Past 1 Month):  (0)  Duration (Past 1 Month):  (0)  Controllability (Past 1 Month):  (0)  Deterrents (Past 1 Month):  (0)  Reasons for Ideation (Past 1 Month):  (0)  Suicidal Behavior (Recent)  Actual Attempt (Past 3 Months): No  Total Number of Actual Attempts (Past 3 Months): 0  Has subject engaged in non-suicidal self-injurious behavior? (Past 3 Months): No  Interrupted Attempts (Past 3 Months): No  Total Number of Interrupted Attempts (Past 3 Months): 0  Aborted or Self-Interrupted Attempt (Past 3 Months): No  Total Number of Aborted or  Self-Interrupted Attempts (Past 3 Months): 0  Preparatory Acts or Behavior (Past 3 Months): No  Total Number of Preparatory Acts (Past 3 Months): 0    Environmental or Psychosocial Events: other life stressors  Protective Factors: Protective Factors: strong bond to family unit, community support, or employment, intact marriage or domestic partnership, supportive ongoing medical and mental health care relationships    Does the patient have thoughts of harming others? Feels Like Hurting Others: no  Previous Attempt to Hurt Others: no  Is the patient engaging in sexually inappropriate behavior?: no  Does Patient have a known history of aggressive behavior: No  Has aggression occurred as a result of MH concerns/diagnosis: No.  Does patient have history of aggression in hospital: No.    Is the patient engaging in sexually inappropriate behavior?  no        Mental Status Exam   Affect: Labile  Appearance: Disheveled  Attention Span/Concentration: Inattentive  Eye Contact: Variable    Fund of Knowledge: Delayed   Language /Speech Content: Non-Fluent  Language /Speech Volume: Mute  Language /Speech Rate/Productions: Minimally Responsive  Recent Memory: Other (please comment) (unable to assess)  Remote Memory: Other (please comment) (unable to assess)  Mood: Anxious  Orientation to Person: Yes   Orientation to Place: Answer (please comment) (unable to assess)  Orientation to Time of Day: Answer (please comment) (unable to assess)  Orientation to Date: Answer (please comment) (unable to assess)     Situation (Do they understand why they are here?): Answer (please comment) (unable to assess)  Psychomotor Behavior: Underactive  Thought Content: Other (please comment) (unable to assess)  Thought Form: Other (please comment) (unable to assess)     Medication  Psychotropic medications:   Medication Orders - Psychiatric (From admission, onward)      Start     Dose/Rate Route Frequency Ordered Stop    01/25/25 1550  OLANZapine zydis  (zyPREXA) ODT tab 10 mg         10 mg Oral 2 TIMES DAILY PRN 01/25/25 6760               Current Care Team  Patient Care Team:  He Garcia MD as PCP - Jaspal Randhawa MD (Family Practice)  Justina Katz MD as Resident (Student in organized health care education/training program)    Diagnosis  Patient Active Problem List   Diagnosis Code    Mood disorder in conditions classified elsewhere F06.30    Conversion disorder F44.9    Attention deficit hyperactivity disorder (ADHD) F90.9    Anxiety F41.9    Pityriasis rosea L42    Hyperlipidemia, mixed E78.2    Abnormal uterine bleeding N93.9    Serum creatinine raised R79.89    Polycythemia D75.1    Obesity E66.9    Hypothyroidism E03.9    HTN (hypertension) I10    Gender dysphoria in adult F64.0    Fatty liver K76.0    Environmental allergies Z91.09    Chronic bilateral low back pain with bilateral sciatica M54.42, M54.41, G89.29    Asthma J45.909    Autism spectrum disorder F84.0    Psychosis (H) F29       Primary Problem This Admission  Active Hospital Problems    Autism spectrum disorder F84.0      Psychosis (H) F29      Attention deficit hyperactivity disorder (ADHD) F90.9      Anxiety F41.9    Clinical Summary and Substantiation of Recommendations   Clinical Substantiation:  It is the recommendation of this writer that pt be admitted to an inpatient psychiatric unit as he is currently unable to care for himself in the community. He has not slept in 4 days, has not been showering or preparing his food, and has needed reminders to eat, drink, and use the bathroom. He is non-verbal during his assessment with this writer and does not appear to track the conversation that this writer has with his partner despite being in the room during it. However, when his partner asks if he would like to stay in the hospital, he does nod yes. He has only been remembering to take his psychiatric medication 4 out of 7 days at most. Pt should be evaluated for  a 72 HH if he were to request to leave given that he has not slept in 4 days and has been unable to care for himself at home.    Goals for crisis stabilization:  increase sleep, increase coherence    Next steps for Care Team:  continue to monitor pt for possible signs of catatonia    Treatment Objectives Addressed:  processing feelings, assessing safety    Therapeutic Interventions:  Engaged in guided discovery, explored patient's perspectives and helped expand them through socratic dialogue.    Has a specific means been identified for suicidal/homicide actions: No    Patient coping skills attempted to reduce the crisis:  Pt has been seeking out support from his partners.    Disposition  Recommended referrals: Individual Therapy, Medication Management, Programmatic Care        Reviewed case and recommendations with attending provider. Attending Name: Dr. Villagran       Attending concurs with disposition: yes       Patient and/or validated legal guardian concurs with disposition:   yes       Final disposition:  inpatient mental health      Severe psychiatric, behavioral or other comorbid conditions are appropriate for management at inpatient mental health as indicated by at least one of the following: Psychiatric Symptoms, Impaired impulse control, judgement, or insight, Symptoms of impact to function  Severe dysfunction in daily living is present as indicated by at least one of the following: Incapacitation because of grave disability, Extreme deterioration in social interactions, Extreme disruption in vegetative function, Complete inability to maintain any appropriate aspect of personal responsibility in any adult roles  Situation and expectations are appropriate for inpatient care: Around-the-clock medical and nursing care to address symptoms and initiate intervention is required, Voluntary treatment at lower level of care is not feasible, Patient management/treatment at lower level of care is not feasible or is  inappropriate  Inpatient mental health services are necessary to meet patient needs and at least one of the following: Specific condition related to admission diagnosis is present and judged likely to deteriorate in absence of treatment at proposed level of care      Legal status: Voluntary/Patient has signed consent for treatment                                                   Reviewed court records: yes       Assessment Details   Total duration spent with the patient: 30 min     CPT code(s) utilized: 60033 - Psychotherapy for Crisis - 60 (30-74*) min    MARIANA Hogue, Psychotherapist  [facility] - Triage & Transition Services  Callback: [phone]

## 2025-01-25 NOTE — TELEPHONE ENCOUNTER
S: [facility] ED , DEC  Blanka    calling at 4:39 PM   about a 35 year old/Male presenting with psychosis     B: Pt arrived via Friend. Presenting problem, stressors: Pt has not slept in 4 days, unable to care for himself. Pt is non verbal d/t autism and not orientated.     Pt affect in ED: Labile laughing   Pt Dx: Generalized Anxiety Disorder, Unspecified Psychosis, ADHD, and Autism Spectrum Disorder  Previous IPMH hx? Yes: April 2022 [facility]   Pt denies SI   Hx of suicide attempt? Yes: unknown   Pt has a remote hx of SIB via unknown   Pt denies HI   Pt denies hallucinations .   Pt RARS Score: 2    Hx of aggression/violence, sexual offenses, legal concerns, Epic care plan? describe: no   Current concerns for aggression this visit? No  Does pt have a history of Civil Commitment? No  Is Pt their own guardian? Yes    Pt is prescribed medication. Is patient medication compliant? Yes, but due to MH concerns patient is missing doses   Pt endorses OP services: Psychiatrist and [facility] Worker  CD concerns: Actively using/consuming Marijuana   Acute or chronic medical concerns: No   Does Pt present with specific needs, assistive devices, or exclusionary criteria? None      Pt is ambulatory  Pt is able to perform ADLs independently      A: Pt to be reviewed for Pending sale to [facility] admission. Pt is Voluntary  Preferred placement: [facility]    COVID Symptoms: No  If yes, COVID test required   Utox: Ordered, not yet collected   CMP: Not ordered, intake requested lab  CBC: Not ordered, intake requested lab  HCG: N/A    R: Patient cleared and ready for behavioral bed placement: Yes  Pt placed on Pending sale to [facility] worklist? Yes    Does Patient need a Transfer Center request created? Yes, writer completed Transfer Center request at:  7:51 PM    R: [place]  Access Inpatient Bed Call Log 1/25/2025 @3:20 PM:        Intake has called facilities that have not updated the bed status within the last 12 hours.         [facility] is posting 0 beds.                  [facility] is posting 7 beds. [phone]: per call at 7:05 am to [facility], they have 2 low acuity and 1 high acuity bed avail in APS.  Per call with Celia @1:12 PM no open beds available   [facility] is posting 0 beds. Negative covid required.       [facility] is posting 0 beds. Neg covid. No high school/[facility]. [phone]. Per call at 7:08 am to [facility], call back later.    [facility] is posting 0 beds. [phone]       [facility] is posting 0 beds. [phone]        [facility] is posting 6 beds. (Ages 18-35) Negative covid, no aggression, physical or sexual assault, violence hx or drug abuse, or psychosis.  [phone]; per call at 7:09 am, left vm asking for a call back with bed avail.  Per call @12:20 PM No young adult beds available today  [facility] is posting 0 beds.        [facility] is posting 0 beds [phone]               Pt remains on the work list pending appropriate bed availability.

## 2025-01-25 NOTE — PHARMACY-ADMISSION MEDICATION HISTORY
Pharmacist Admission Medication History    Admission medication history is complete. The information provided in this note is only as accurate as the sources available at the time of the update.    Information Source(s): Patient, Caregiver, and CareEverywhere/[employer]s via in-person    Pertinent Information: Patient and partner both do not know medication names or regimen.  Receive prescriptions in daily blister packs from [facility] to take.  Med list compiled largely from [employer] records.  Patient's partner endorses that patient would not remember last time medications were taken, and have likely not been taken consistently.  Patient is unfortunately poor historian, giggling and unable to engage with conversation lucidly.    Changes made to PTA medication list:  Added: bupropion, duloxetine, famotidine, labetalol, losartan, lurasidone, pantoprazole  Deleted: Adderall, escitalopram, Flonase, hydrochlorothiazide, Norco, loratadine, lorazepam, melatonin, metoprolol, multivitamin, omeprazole, triamcinolone lotion  Changed: Amlodipine (2.5 mg daily to 5 mg bid)    Allergies reviewed with patient and updates made in EHR: unable to assess    Medication History Completed By: Boaz Donaldson [facility] 1/25/2025 4:42 PM    PTA Med List   Medication Sig Last Dose/Taking    amLODIPine (NORVASC) 5 MG tablet Take 5 mg by mouth 2 times daily. Unknown    buPROPion (WELLBUTRIN XL) 150 MG 24 hr tablet Take 150 mg by mouth every morning. Unknown    cetirizine (ZYRTEC) 10 MG tablet Take 10 mg by mouth daily as needed for allergies Unknown    DULoxetine (CYMBALTA) 30 MG capsule Take 30 mg by mouth daily. Unknown    Escitalopram Oxalate (LEXAPRO PO) Take 20 mg by mouth daily Unknown    famotidine (PEPCID) 20 MG tablet Take 20 mg by mouth daily. Unknown    labetalol (NORMODYNE) 100 MG tablet Take 100 mg by mouth 2 times daily. Unknown    levothyroxine (SYNTHROID/LEVOTHROID) 100 MCG tablet Take 100 mcg by mouth daily. Unknown    losartan  (COZAAR) 25 MG tablet Take 25 mg by mouth daily. Unknown    lurasidone (LATUDA) 20 MG TABS tablet Take 20 mg by mouth daily with food. Unknown    pantoprazole (PROTONIX) 20 MG EC tablet Take 40 mg by mouth daily. Unknown    testosterone cypionate (DEPOTESTOTERONE) 200 MG/ML injection Inject into the muscle once a week Inject .35 mls weekly 1/19/2025

## 2025-01-26 ENCOUNTER — TELEPHONE (OUTPATIENT)
Dept: BEHAVIORAL HEALTH | Facility: CLINIC | Age: 35
End: 2025-01-26
Payer: COMMERCIAL

## 2025-01-26 VITALS
DIASTOLIC BLOOD PRESSURE: 78 MMHG | SYSTOLIC BLOOD PRESSURE: 140 MMHG | RESPIRATION RATE: 18 BRPM | TEMPERATURE: 98 F | OXYGEN SATURATION: 98 % | HEART RATE: 88 BPM

## 2025-01-26 PROCEDURE — 250N000013 HC RX MED GY IP 250 OP 250 PS 637: Performed by: EMERGENCY MEDICINE

## 2025-01-26 RX ADMIN — BUPROPION HYDROCHLORIDE 150 MG: 150 TABLET, EXTENDED RELEASE ORAL at 08:27

## 2025-01-26 RX ADMIN — DULOXETINE HYDROCHLORIDE 30 MG: 30 CAPSULE, DELAYED RELEASE ORAL at 08:27

## 2025-01-26 RX ADMIN — LABETALOL HYDROCHLORIDE 100 MG: 100 TABLET, FILM COATED ORAL at 08:28

## 2025-01-26 RX ADMIN — LOSARTAN POTASSIUM 25 MG: 25 TABLET, FILM COATED ORAL at 08:28

## 2025-01-26 RX ADMIN — LURASIDONE HYDROCHLORIDE 20 MG: 20 TABLET, COATED ORAL at 10:06

## 2025-01-26 RX ADMIN — FAMOTIDINE 20 MG: 20 TABLET, FILM COATED ORAL at 08:36

## 2025-01-26 RX ADMIN — LEVOTHYROXINE SODIUM 100 MCG: 100 TABLET ORAL at 08:28

## 2025-01-26 RX ADMIN — AMLODIPINE BESYLATE 5 MG: 5 TABLET ORAL at 08:28

## 2025-01-26 RX ADMIN — PANTOPRAZOLE SODIUM 40 MG: 40 TABLET, DELAYED RELEASE ORAL at 08:28

## 2025-01-26 RX ADMIN — ESCITALOPRAM OXALATE 20 MG: 10 TABLET ORAL at 08:36

## 2025-01-26 ASSESSMENT — ACTIVITIES OF DAILY LIVING (ADL)
ADLS_ACUITY_SCORE: 41

## 2025-01-26 ASSESSMENT — COLUMBIA-SUICIDE SEVERITY RATING SCALE - C-SSRS
TOTAL  NUMBER OF ABORTED OR SELF INTERRUPTED ATTEMPTS SINCE LAST CONTACT: NO
6. HAVE YOU EVER DONE ANYTHING, STARTED TO DO ANYTHING, OR PREPARED TO DO ANYTHING TO END YOUR LIFE?: NO
2. HAVE YOU ACTUALLY HAD ANY THOUGHTS OF KILLING YOURSELF?: NO
1. SINCE LAST CONTACT, HAVE YOU WISHED YOU WERE DEAD OR WISHED YOU COULD GO TO SLEEP AND NOT WAKE UP?: NO
TOTAL  NUMBER OF INTERRUPTED ATTEMPTS SINCE LAST CONTACT: NO
ATTEMPT SINCE LAST CONTACT: NO
SUICIDE, SINCE LAST CONTACT: NO

## 2025-01-26 NOTE — ED NOTES
Patient seen sitting up on cart in room. I then again attempted to get him to take his scheduled medications. He offered no verbal response. He took the water offered to him and drank, set the medications on the beside table, covered himself back up and shut his eyes.

## 2025-01-26 NOTE — TELEPHONE ENCOUNTER
R:  No beds available within [facility].  Outside bed search intiated in [place] @ 8:55am    [facility] is posting 7 beds. [phone]: per call at 7:06 am to [facility], they are at cap.    [facility] is posting 0 beds. Negative covid required.        [facility] is posting 0 beds. Neg covid. No high school/[facility]. [phone]. Per call at 7:07 am to [place], they are at cap.    [facility] is posting 0 beds. [phone]        [facility] is posting 0 beds. [phone]         [facility] is posting 6 beds. (Ages 18-35) Negative covid, no aggression, physical or sexual assault, violence hx or drug abuse, or psychosis.  [phone]; per call at 7:09 am, left vm asking for a call back with bed availability info.    [facility] is posting 0 beds.         [facility] is posting 0 beds [phone]                    Pt remains on the work list pending appropriate bed availability.

## 2025-01-26 NOTE — DISCHARGE INSTRUCTIONS
Your Upcoming Appointment    Type: Therapy - Initial (In-Person)  Date: Wednesday, 1/29/2025    Time: 3:00 pm - 4:00 pm  Provider: Dinah Vieira MA  [facility]  Location: [facility], [address] [place] [address], [place] [address]  Phone: [phone]

## 2025-01-26 NOTE — ED PROVIDER NOTES
Signout taken from Dr. Villagran.  Patient is here voluntarily with acute psychosis.  Apparently the patient has had 4 days of insomnia as well.  He has been seen by DEC and is awaiting an inpatient mental health bed.  All orders including a med rec have been completed.  No issues during my shift.     Ariel Douglas MD  01/26/25 0106

## 2025-01-26 NOTE — TELEPHONE ENCOUNTER
No appropriate bed availability for pt at this rolf.    R: M R: MN  Access Inpatient Bed Call Log 1/26/2025 @1:00 AM:        Intake has called facilities that have not updated the bed status within the last 12 hours.     (Adults):  [facility] is posting 0 beds.                 [facility] is posting 7 beds. [phone]: per call at 7:05 am to [facility], they have 2 low acuity and 1 high acuity bed avail in APS.  Per call with Celia @1:12 PM no open beds available   [facility] is posting 0 beds. Negative covid required.       [facility] is posting 0 beds. Neg covid. No high school/[facility]. [phone]. Per call at 7:08 am to [facility], call back later.    [facility] is posting 0 beds. [phone]       [facility] is posting 0 beds. [phone]        [facility] is posting 6 beds. (Ages 18-35) Negative covid, no aggression, physical or sexual assault, violence hx or drug abuse, or psychosis.  [phone]; per call at 7:09 am, left vm asking for a call back with bed avail.  Per call @12:20 PM No young adult beds available today  [facility] is posting 0 beds.        [facility] is posting 0 beds [phone]               Pt remains on the work list pending appropriate bed availability.

## 2025-01-26 NOTE — ED NOTES
Patient is pleasant, he wants to go home, he feels overstimulated here and he feels he is going to do better at home, patient denies any suicidal ideation, he just met with the dec .

## 2025-01-26 NOTE — ED PROVIDER NOTES
Care signed out to me by Dr. Page.  Please see initial ED provider note regarding history of present illness, physical exam, medical decision making.  In brief patient is a 35-year-old male who was brought him by significant other for 4 days of insomnia.  He has had almost 24 hours of observation while here in the emergency department has had multiple reassessments with our mental health assessment team.  They have discussed appropriate outpatient follow-up and patient feels comfortable with discharging home at this time.  Outpatient resources have been provided.  After return precautions understood and all questions answered, discharged home.      ICD-10-CM    1. Insomnia due to other mental disorder  F51.05     F99       2. Acute psychosis (H)  F23              Jeison Garcia MD  01/26/25 1256

## 2025-01-26 NOTE — PROGRESS NOTES
"Triage and Transition Services Extended Care Reassessment     Patient: Van goes by \"Van,\" uses he/him pronouns  Date of Service: January 26, 2025  Site of Service: [facility] Emergency Dept                             ED18  Patient was seen yes  Mode of Assessment: In person     Reason for Reassessment: significant behavior change, depression, worsening psychosocial stress    History of Patient's Original Emergency Room Encounter: Per collateral report from pt's partner, he carries diagnoses of anxiety, depression, ASD, and ADHD and has a history of suicide attempts. Chart review indicates that pt was hospitalized at [facility] from 4/20-4/23/22 where he was given diagnoses of unspecified psychosis and a rule-out diagnosis of bipolar II. He has no history of commitment.    Current Patient Presentation: Pt reports that they got 8+ hours of sleep after arriving in the ED.  Appetite is good.  Mood is good.  Pt requesting to dc.  He is talkative, calm and cooperative, future orientated and able to advocate for his needs.  Pt is open to seeing a therapist and is hesistant to have a psychiatry appointment scheduled on his behalf.  Pt denies feeling anxious or depressed.  Pt denies any VH, HI or SI.  He endorses AH that make comments on a variety of things.  They are not command in nature.  Pt reports his AH are at baseline.  Pt is open to seeing a therapist and one will be scheduled on his behalf.    Presentation Summary: As above.  Denies any SI, HI, VH.  Endorses AH which pt states are at baseline and not command in nature.    Changes Observed Since Initial Assessment: decrease in presenting symptoms    Therapeutic Interventions Provided: Engaged in safety planning, Engaged in guided discovery, explored patient's perspectives and helped expand them through socratic dialogue.    Current Symptoms:       auditory hallucinations      Mental Status Exam   Affect: Appropriate  Appearance: Appropriate  Attention " Span/Concentration: Attentive  Eye Contact: Engaged    Fund of Knowledge: Appropriate   Language /Speech Content: Fluent  Language /Speech Volume: Normal  Language /Speech Rate/Productions: Articulate, Normal  Recent Memory: Intact  Remote Memory: Intact  Mood: Sad  Orientation to Person: Yes   Orientation to Place: Yes  Orientation to Time of Day: Yes  Orientation to Date: Yes     Situation (Do they understand why they are here?): Yes  Psychomotor Behavior: Normal  Thought Content: Hallucinations (Baseline AH.)  Thought Form: Goal Directed, Intact    Treatment Objective(s) Addressed: rapport building, processing feelings, safety planning, identifying an appropriate aftercare plan    Patient Response to Interventions: acceptance expressed    Progress Towards Goals:  Patient Reports Symptoms Are: improving  Patient Progress Toward Goals: is making progress  Next Step to Work Toward Discharge: follow up on referrals    Case Management: Case Management Included: collaborating with patient's support system  Details on Collaborating with Patient's Support System: Partner has been at pt's side his entire ED stay.  Partner feels pt is ok to dc home.    C-SSRS Since Last Contact:   1. Wish to be Dead (Since Last Contact): No  2. Non-Specific Active Suicidal Thoughts (Since Last Contact): No     Actual Attempt (Since Last Contact): No  Has subject engaged in non-suicidal self-injurious behavior? (Since Last Contact): No  Interrupted Attempts (Since Last Contact): No  Aborted or Self-Interrupted Attempt (Since Last Contact): No  Preparatory Acts or Behavior (Since Last Contact): No  Suicide (Since Last Contact): No     Calculated C-SSRS Risk Score (Since Last Contact): No Risk Indicated    Plan: Final Disposition / Recommended Care Path: discharge  Plan for Care reviewed with assigned Medical Provider: yes  Plan for Care Team Review: provider, RN  Patient and/or validated legal guardian concurs: yes  Clinical Substantiation:  Pt is not an imminent danger to himself or others.  He is future orientated, has a brighter affect than when he first presented, is engaged in interview and is requesting to discharge.    Legal Status: Legal Status: Voluntary/Patient has signed consent for treatment    Session Status: Time session started: 1225  Time session ended: 1250  Session Duration (minutes): 25 minutes    Session Start Time: 1225  Session Stop Time: 1250  CPT codes: 32153 - Psychotherapy (with patient) - 30 (16-37*) min  Time Spent: 25 minutes      CPT code(s) utilized: 59224 - Psychotherapy (with patient) - 30 (16-37*) min    Diagnosis:   Patient Active Problem List   Diagnosis Code    Mood disorder in conditions classified elsewhere F06.30    Conversion disorder F44.9    Attention deficit hyperactivity disorder (ADHD) F90.9    Anxiety F41.9    Pityriasis rosea L42    Hyperlipidemia, mixed E78.2    Abnormal uterine bleeding N93.9    Serum creatinine raised R79.89    Polycythemia D75.1    Obesity E66.9    Hypothyroidism E03.9    HTN (hypertension) I10    Gender dysphoria in adult F64.0    Fatty liver K76.0    Environmental allergies Z91.09    Chronic bilateral low back pain with bilateral sciatica M54.42, M54.41, G89.29    Asthma J45.909    Autism spectrum disorder F84.0    Psychosis (H) F29       Primary Problem This Admission: Active Hospital Problems    Autism spectrum disorder      Psychosis (H)      Attention deficit hyperactivity disorder (ADHD)      Anxiety        Racquel Spaulding, [facility]   Licensed Mental Health Professional (LMHP), [facility]  [phone]

## 2025-01-27 ENCOUNTER — TELEPHONE (OUTPATIENT)
Dept: BEHAVIORAL HEALTH | Facility: CLINIC | Age: 35
End: 2025-01-27
Payer: COMMERCIAL

## 2025-01-28 ENCOUNTER — HOSPITAL ENCOUNTER (EMERGENCY)
Facility: CLINIC | Age: 35
Discharge: PSYCHIATRIC OR SUBSTANCE USE FACILITY | End: 2025-01-30
Attending: EMERGENCY MEDICINE
Payer: COMMERCIAL

## 2025-01-28 ENCOUNTER — TELEPHONE (OUTPATIENT)
Dept: BEHAVIORAL HEALTH | Facility: CLINIC | Age: 35
End: 2025-01-28

## 2025-01-28 DIAGNOSIS — R45.851 SUICIDAL IDEATION: ICD-10-CM

## 2025-01-28 DIAGNOSIS — F23 ACUTE PSYCHOSIS (H): ICD-10-CM

## 2025-01-28 LAB
ALBUMIN SERPL BCG-MCNC: 4.6 G/DL (ref 3.5–5.2)
ALP SERPL-CCNC: 85 U/L (ref 40–150)
ALT SERPL W P-5'-P-CCNC: 23 U/L (ref 0–70)
AMPHETAMINES UR QL SCN: ABNORMAL
ANION GAP SERPL CALCULATED.3IONS-SCNC: 14 MMOL/L (ref 7–15)
AST SERPL W P-5'-P-CCNC: 26 U/L (ref 0–45)
BARBITURATES UR QL SCN: ABNORMAL
BASOPHILS # BLD AUTO: 0 10E3/UL (ref 0–0.2)
BASOPHILS NFR BLD AUTO: 0 %
BENZODIAZ UR QL SCN: ABNORMAL
BILIRUB SERPL-MCNC: 0.9 MG/DL
BUN SERPL-MCNC: 19.2 MG/DL (ref 6–20)
BZE UR QL SCN: ABNORMAL
CALCIUM SERPL-MCNC: 9.1 MG/DL (ref 8.8–10.4)
CANNABINOIDS UR QL SCN: ABNORMAL
CHLORIDE SERPL-SCNC: 101 MMOL/L (ref 98–107)
CREAT SERPL-MCNC: 1.6 MG/DL (ref 0.51–1.17)
EGFRCR SERPLBLD CKD-EPI 2021: 57 ML/MIN/1.73M2
EOSINOPHIL # BLD AUTO: 0 10E3/UL (ref 0–0.7)
EOSINOPHIL NFR BLD AUTO: 0 %
ERYTHROCYTE [DISTWIDTH] IN BLOOD BY AUTOMATED COUNT: 13.7 % (ref 10–15)
ETHANOL SERPL-MCNC: <0.01 G/DL
FENTANYL UR QL: ABNORMAL
GLUCOSE SERPL-MCNC: 76 MG/DL (ref 70–99)
HCO3 SERPL-SCNC: 23 MMOL/L (ref 22–29)
HCT VFR BLD AUTO: 46.7 % (ref 35–53)
HGB BLD-MCNC: 15.8 G/DL (ref 11.7–17.7)
HOLD SPECIMEN: NORMAL
IMM GRANULOCYTES # BLD: 0.1 10E3/UL
IMM GRANULOCYTES NFR BLD: 0 %
LYMPHOCYTES # BLD AUTO: 1.8 10E3/UL (ref 0.8–5.3)
LYMPHOCYTES NFR BLD AUTO: 16 %
MCH RBC QN AUTO: 27.3 PG (ref 26.5–33)
MCHC RBC AUTO-ENTMCNC: 33.8 G/DL (ref 31.5–36.5)
MCV RBC AUTO: 81 FL (ref 78–100)
MONOCYTES # BLD AUTO: 0.8 10E3/UL (ref 0–1.3)
MONOCYTES NFR BLD AUTO: 7 %
NEUTROPHILS # BLD AUTO: 8.5 10E3/UL (ref 1.6–8.3)
NEUTROPHILS NFR BLD AUTO: 76 %
NRBC # BLD AUTO: 0 10E3/UL
NRBC BLD AUTO-RTO: 0 /100
OPIATES UR QL SCN: ABNORMAL
PCP QUAL URINE (ROCHE): ABNORMAL
PLATELET # BLD AUTO: 343 10E3/UL (ref 150–450)
POTASSIUM SERPL-SCNC: 3.8 MMOL/L (ref 3.4–5.3)
PROT SERPL-MCNC: 7.7 G/DL (ref 6.4–8.3)
RBC # BLD AUTO: 5.78 10E6/UL (ref 3.8–5.9)
SODIUM SERPL-SCNC: 138 MMOL/L (ref 135–145)
WBC # BLD AUTO: 11.2 10E3/UL (ref 4–11)

## 2025-01-28 PROCEDURE — 82077 ASSAY SPEC XCP UR&BREATH IA: CPT | Performed by: EMERGENCY MEDICINE

## 2025-01-28 PROCEDURE — 99285 EMERGENCY DEPT VISIT HI MDM: CPT

## 2025-01-28 PROCEDURE — 36415 COLL VENOUS BLD VENIPUNCTURE: CPT | Performed by: EMERGENCY MEDICINE

## 2025-01-28 PROCEDURE — 80307 DRUG TEST PRSMV CHEM ANLYZR: CPT | Performed by: EMERGENCY MEDICINE

## 2025-01-28 PROCEDURE — 82040 ASSAY OF SERUM ALBUMIN: CPT | Performed by: EMERGENCY MEDICINE

## 2025-01-28 PROCEDURE — 85004 AUTOMATED DIFF WBC COUNT: CPT | Performed by: EMERGENCY MEDICINE

## 2025-01-28 PROCEDURE — 250N000013 HC RX MED GY IP 250 OP 250 PS 637: Performed by: EMERGENCY MEDICINE

## 2025-01-28 PROCEDURE — 80053 COMPREHEN METABOLIC PANEL: CPT | Performed by: EMERGENCY MEDICINE

## 2025-01-28 PROCEDURE — 85014 HEMATOCRIT: CPT | Performed by: EMERGENCY MEDICINE

## 2025-01-28 RX ORDER — LOSARTAN POTASSIUM 25 MG/1
25 TABLET ORAL DAILY
Status: DISCONTINUED | OUTPATIENT
Start: 2025-01-28 | End: 2025-01-30 | Stop reason: HOSPADM

## 2025-01-28 RX ORDER — LABETALOL 100 MG/1
100 TABLET, FILM COATED ORAL 2 TIMES DAILY
Status: DISCONTINUED | OUTPATIENT
Start: 2025-01-28 | End: 2025-01-30 | Stop reason: HOSPADM

## 2025-01-28 RX ORDER — AMLODIPINE BESYLATE 5 MG/1
5 TABLET ORAL 2 TIMES DAILY
Status: DISCONTINUED | OUTPATIENT
Start: 2025-01-28 | End: 2025-01-30 | Stop reason: HOSPADM

## 2025-01-28 RX ORDER — LEVOTHYROXINE SODIUM 100 UG/1
100 TABLET ORAL DAILY
Status: DISCONTINUED | OUTPATIENT
Start: 2025-01-29 | End: 2025-01-30 | Stop reason: HOSPADM

## 2025-01-28 RX ORDER — CETIRIZINE HYDROCHLORIDE 10 MG/1
10 TABLET ORAL DAILY PRN
Status: DISCONTINUED | OUTPATIENT
Start: 2025-01-28 | End: 2025-01-30 | Stop reason: HOSPADM

## 2025-01-28 RX ORDER — OLANZAPINE 5 MG/1
5 TABLET, ORALLY DISINTEGRATING ORAL ONCE
Status: COMPLETED | OUTPATIENT
Start: 2025-01-28 | End: 2025-01-28

## 2025-01-28 RX ORDER — ESCITALOPRAM OXALATE 10 MG/1
20 TABLET ORAL DAILY
Status: DISCONTINUED | OUTPATIENT
Start: 2025-01-28 | End: 2025-01-30 | Stop reason: HOSPADM

## 2025-01-28 RX ORDER — BUPROPION HYDROCHLORIDE 150 MG/1
150 TABLET ORAL EVERY MORNING
Status: DISCONTINUED | OUTPATIENT
Start: 2025-01-29 | End: 2025-01-30

## 2025-01-28 RX ORDER — FAMOTIDINE 20 MG/1
20 TABLET, FILM COATED ORAL DAILY
Status: DISCONTINUED | OUTPATIENT
Start: 2025-01-29 | End: 2025-01-30 | Stop reason: HOSPADM

## 2025-01-28 RX ORDER — DULOXETIN HYDROCHLORIDE 30 MG/1
30 CAPSULE, DELAYED RELEASE ORAL DAILY
Status: DISCONTINUED | OUTPATIENT
Start: 2025-01-28 | End: 2025-01-30 | Stop reason: HOSPADM

## 2025-01-28 RX ORDER — LURASIDONE HYDROCHLORIDE 20 MG/1
20 TABLET, FILM COATED ORAL
Status: DISCONTINUED | OUTPATIENT
Start: 2025-01-29 | End: 2025-01-30 | Stop reason: HOSPADM

## 2025-01-28 RX ADMIN — CETIRIZINE HYDROCHLORIDE 10 MG: 10 TABLET, FILM COATED ORAL at 20:07

## 2025-01-28 RX ADMIN — LABETALOL HYDROCHLORIDE 100 MG: 100 TABLET, FILM COATED ORAL at 20:07

## 2025-01-28 RX ADMIN — AMLODIPINE BESYLATE 5 MG: 5 TABLET ORAL at 20:07

## 2025-01-28 RX ADMIN — DULOXETINE HYDROCHLORIDE 30 MG: 30 CAPSULE, DELAYED RELEASE ORAL at 15:43

## 2025-01-28 RX ADMIN — LOSARTAN POTASSIUM 25 MG: 25 TABLET, FILM COATED ORAL at 15:43

## 2025-01-28 RX ADMIN — OLANZAPINE 5 MG: 5 TABLET, ORALLY DISINTEGRATING ORAL at 15:43

## 2025-01-28 ASSESSMENT — COLUMBIA-SUICIDE SEVERITY RATING SCALE - C-SSRS
5. HAVE YOU STARTED TO WORK OUT OR WORKED OUT THE DETAILS OF HOW TO KILL YOURSELF? DO YOU INTEND TO CARRY OUT THIS PLAN?: YES
1. IN THE PAST MONTH, HAVE YOU WISHED YOU WERE DEAD OR WISHED YOU COULD GO TO SLEEP AND NOT WAKE UP?: YES
2. HAVE YOU ACTUALLY HAD ANY THOUGHTS OF KILLING YOURSELF IN THE PAST MONTH?: YES

## 2025-01-28 ASSESSMENT — ACTIVITIES OF DAILY LIVING (ADL)
ADLS_ACUITY_SCORE: 41

## 2025-01-28 NOTE — ED NOTES
Video Observation initiated, patient informed.     Faby Saha RN  Safety search performed by security. Pt arrives with SO and a stuffed animal.  Pt extremely disorganized thinking,

## 2025-01-28 NOTE — CONSULTS
Diagnostic Evaluation Consultation  Crisis Assessment    Patient Name: Van Ochoa  Age:  35 year old  Legal Sex: male  Gender Identity: male  Pronouns:   Race: White  Ethnicity: Not  or   Language: English      Patient was assessed: Virtual: Razient   Crisis Assessment Start Date: 01/28/25  Crisis Assessment Start Time: 1220  Crisis Assessment Stop Time: 1238  Patient location: [facility] Emergency Dept                             ED20    Referral Data and Chief Complaint  Van Ochoa presents to the ED with family/friends. Patient is presenting to the ED for the following concerns: Worsening psychosocial stress, Significant behavioral change, Suicidal ideation (disorganized thinking and behavior). Factors that make the mental health crisis life threatening or complex are: Following the recent presential inaugaration, the patient mental health began unraveling. He is not oriented to person, place, time, or date. Patient is transgender male and he has been fearful of what will occur with this new president. The patient's significant other (Jimbo) reports that Van has severe insomnia due to a mental health disorder. He has history for autism, psychosis, depression, and ADHD. Van hasn't slept for multiple days and is not within his normal baseline. Patient was not engaged in crisis assessment and made multiple random one word statements that were not related to the discussion. During triage he made suicidal comments that he was going to shoot himself with a gun. The significant other reports that Van does not regularly taking his meds, and is out of his meds as he is in between psychiatry. The patient has limited appetite, not showering, and not caring for himself. He spends alot of time during the day and night Stimming..      Informed Consent and Assessment Methods  Explained the crisis assessment process, including applicable information disclosures and limits to confidentiality,  "assessed understanding of the process, and obtained consent to proceed with the assessment.  Assessment methods included conducting a formal interview with patient, review of medical records, collaboration with medical staff, and obtaining relevant collateral information from family and community providers when available.  : done     History of the Crisis   Per collateral report from pt's partner, he carries diagnoses of anxiety, depression, ASD, and ADHD and has a history of suicide attempts. Chart review indicates that pt was hospitalized at [facility] from 4/20-4/23/22 where he was given diagnoses of unspecified psychosis and a rule-out diagnosis of bipolar II. He has no history of commitment. SO reports the patient has been on mood stabilzers in the past and they end up making him \"a zombie\" and he eventually stops taking them.    Brief Psychosocial History  Family:  Lives with Significant Other, Children no  Support System:  Partner, Neighbor, Other (specify) (Friends, [facility] worker)  Employment Status:  disabled  Source of Income:  disability  Financial Environmental Concerns:  none  Current Hobbies:  television/movies/videos, music  Barriers in Personal Life:  mental health concerns    Significant Clinical History  Current Anxiety Symptoms:  racing thoughts, anxious, obsessions/compulsions  Current Depression/Trauma:  sense of doom, difficulty concentrating, thoughts of death/suicide, decreased libido  Current Somatic Symptoms:  racing thoughts, anxious, excessive worry  Current Psychosis/Thought Disturbance:  impulsive, inattentive  Current Eating Symptoms:     Chemical Use History:  Alcohol: None  Benzodiazepines: None  Opiates: None  Cocaine: None  Marijuana: Daily  Last Use:: 01/27/25  Other Use: None   Past diagnosis:  ADHD, Autism, Depression, Other  Family history:  No known history of mental health or chemical health concerns  Past treatment:  Psychiatric Medication Management, Inpatient Hospitalization, " "[facility], [facility]  Details of most recent treatment:  Pt has an established ARM worker. His ARM worker is referring him to new psychiatrists.  Other relevant history:  No other relevant history.    Have there been any medication changes in the past two weeks:  no       Is the patient compliant with medications:  no  When he needs mood stabilzers he will take them for a while until they begin to impact his daily thinking and functioning. He has stated they will take away his quality of life and make him seem like \"a zombie.\"     Collateral Information  Is there collateral information: Yes     Collateral information name, relationship, phone number:  Jimmy Ortizyer, partner, PH: [phone]    What happened today: Jimmy was present during this interview and assisted in providing collateral information.     What is different about patient's functioning: As a transgender man, pt has been very fearful about the change in presidents. Following the presidential inauguration, pt's mental health began to deteriorate. He has not slept in 4 days and has not been able to shower or prepare food for himself. He needs to be reminded to eat, drink, and go to the bathroom. He has not been reporting SI or HI. Pt is often non-verbal when highly anxious as part of his ASD; however, even during these times, he is still able to sleep and care for himself. Pt historically has difficulty remembering to take his psychiatric medication and takes his medication 4 out of 7 days a week at most.     What do you think the patient needs:  Inpatient stabilziation    Has patient made comments about wanting to kill themselves/others: yes    If d/c is recommended, can they take part in safety/aftercare planning:  yes        Risk Assessment  [place] Suicide Severity Rating Scale Full Clinical Version:        [place] Suicide Severity Rating Scale Recent:   Suicidal Ideation (Recent)  Q1 Wished to be Dead (Past Month): yes  Q2 Suicidal Thoughts " (Past Month): yes  Q3 Suicidal Thought Method: yes  Q4 Suicidal Intent without Specific Plan: no  Q5 Suicide Intent with Specific Plan: yes  Level of Risk per Screen: high risk  Intensity of Ideation (Recent)  Most Severe Ideation Rating (Past 1 Month): 5  Frequency (Past 1 Month): Daily or almost daily  Duration (Past 1 Month): Less than 1 hour/some of the time  Controllability (Past 1 Month): Can control thoughts with little difficulty  Deterrents (Past 1 Month): Deterrents probably stopped you  Reasons for Ideation (Past 1 Month): Mostly to end or stop the pain (You couldn't go on living with the pain or how you were feeling)  Suicidal Behavior (Recent)  Actual Attempt (Past 3 Months): No  Total Number of Actual Attempts (Past 3 Months): 0  Has subject engaged in non-suicidal self-injurious behavior? (Past 3 Months): No  Interrupted Attempts (Past 3 Months): No  Total Number of Interrupted Attempts (Past 3 Months): 0  Aborted or Self-Interrupted Attempt (Past 3 Months): No  Total Number of Aborted or Self-Interrupted Attempts (Past 3 Months): 0  Preparatory Acts or Behavior (Past 3 Months): No  Total Number of Preparatory Acts (Past 3 Months): 0    Environmental or Psychosocial Events: other life stressors  Protective Factors: Protective Factors: strong bond to family unit, community support, or employment, intact marriage or domestic partnership, supportive ongoing medical and mental health care relationships    Does the patient have thoughts of harming others? Feels Like Hurting Others: no  Previous Attempt to Hurt Others: no  Current presentation:  (calm, cooperative, disorganized)  Is the patient engaging in sexually inappropriate behavior?: no  Does Patient have a known history of aggressive behavior: No  Has aggression occurred as a result of MH concerns/diagnosis: No.  Does patient have history of aggression in hospital: No.    Is the patient engaging in sexually inappropriate behavior?  no        Mental  Status Exam   Affect: Dramatic  Appearance: Appropriate  Attention Span/Concentration: Inattentive  Eye Contact: Variable    Fund of Knowledge: Appropriate   Language /Speech Content: Fluent  Language /Speech Volume: Normal  Language /Speech Rate/Productions: Minimally Responsive  Recent Memory: Variable  Remote Memory: Variable  Mood: Anxious  Orientation to Person: No   Orientation to Place: No  Orientation to Time of Day: No  Orientation to Date: No     Situation (Do they understand why they are here?): No  Psychomotor Behavior: Normal  Thought Content: Hallucinations  Thought Form: Other (please comment) (Loose)        Medication  Psychotropic medications:   Medication Orders - Psychiatric (From admission, onward)      Start     Dose/Rate Route Frequency Ordered Stop    01/29/25 0800  buPROPion (WELLBUTRIN XL) 24 hr tablet 150 mg         150 mg Oral EVERY MORNING 01/28/25 1333      01/29/25 0800  lurasidone (LATUDA) tablet 20 mg         20 mg Oral DAILY WITH BREAKFAST 01/28/25 1333      01/28/25 1335  DULoxetine (CYMBALTA) DR capsule 30 mg         30 mg Oral DAILY 01/28/25 1333      01/28/25 1335  escitalopram (LEXAPRO) tablet 20 mg         20 mg Oral DAILY 01/28/25 1333      01/28/25 1245  OLANZapine zydis (zyPREXA) ODT tab 5 mg         5 mg Oral ONCE 01/28/25 1241               Current Care Team  Patient Care Team:  He Garcia MD as PCP - Jaspal Randhawa MD (Family Practice)  Justina Katz MD as Resident (Student in organized health care education/training program)    Diagnosis  Patient Active Problem List   Diagnosis Code    Mood disorder in conditions classified elsewhere F06.30    Conversion disorder F44.9    Attention deficit hyperactivity disorder (ADHD) F90.9    Anxiety F41.9    Pityriasis rosea L42    Hyperlipidemia, mixed E78.2    Abnormal uterine bleeding N93.9    Serum creatinine raised R79.89    Polycythemia D75.1    Obesity E66.9    Hypothyroidism E03.9    HTN  (hypertension) I10    Gender dysphoria in adult F64.0    Fatty liver K76.0    Environmental allergies Z91.09    Chronic bilateral low back pain with bilateral sciatica M54.42, M54.41, G89.29    Asthma J45.909    Active autistic disorder F84.0    Psychosis (H) F29       Primary Problem This Admission  Active Hospital Problems    *Active autistic disorder    F84    Clinical Summary and Substantiation of Recommendations   Clinical Substantiation:  Following the recent presential inaugaration, the patient mental health began unraveling. He is not oriented to person, place, time, or date. Patient is transgender male and he has been fearful of what will occur with this new president. The patient's significant other (Jimbo) reports that Van has severe insomnia due to a mental health disorder. He has history for autism, psychosis, depression, and ADHD. Van hasn't slept for multiple days and is not within his normal baseline. Patient was not engaged in crisis assessment and made multiple random one word statements that were not related to the discussion. During triage he made suicidal comments that he was going to shoot himself with a gun. The significant other reports that Van does not regularly taking his meds, and is out of his meds as he is in between psychiatry. The patient has limited appetite, not showering, and not caring for himself. He spends alot of time during the day and night Stimming. Pt is recommended for inpatient mental health for stabilization and safety.    Goals for crisis stabilization:  increase sleep, safety monitoring, increase coherence of medications.    Next steps for Care Team:  Monitory for possible sympotms of catanoia and suicidal ideation.    Treatment Objectives Addressed:  assessing safety, identifying additional supports, orienting the patient to therapy, rapport building    Therapeutic Interventions:  Engaged in guided discovery, explored patient's perspectives and helped expand them  through socratic dialogue.    Has a specific means been identified for suicidal/homicide actions: Yes    If yes, describe:  He stated he would use a firearm    Explain action steps toward mitigation:  Jimbo (significant other) reports there are no firearms in the home and Van does not have access to a gun.    Document completion of mitigation actions:  No firearms in the home, per SO.    The follow up action still needed prior to discharge:  Safety planning with patient and SO    Patient coping skills attempted to reduce the crisis:  Pt has been seeking out support from his partners. Pt frequently Stimms. He has been utilizing services of [facility].    Disposition  Recommended referrals: Individual Therapy, Medication Management        Reviewed case and recommendations with attending provider. Attending Name: Consulted with provider Brad Tang       Attending concurs with disposition: yes       Patient and/or validated legal guardian concurs with disposition:   yes       Final disposition:  inpatient mental health         Imminent risk of harm: Suicidal Behavior  Severe psychiatric, behavioral or other comorbid conditions are appropriate for management at inpatient mental health as indicated by at least one of the following: Impaired impulse control, judgement, or insight  Severe dysfunction in daily living is present as indicated by at least one of the following: Complete withdrawal from all social interactions, Complete neglect of self care with associated impairment in physical status  Situation and expectations are appropriate for inpatient care: Patient management/treatment at lower level of care is not feasible or is inappropriate, Around-the-clock medical and nursing care to address symptoms and initiate intervention is required  Inpatient mental health services are necessary to meet patient needs and at least one of the following: Specific condition related to admission diagnosis is present and judged likely  to further improve at proposed level of care, Specific condition related to admission diagnosis is present and judged likely to deteriorate in absence of treatment at proposed level of care      Legal status: Voluntary/Patient has signed consent for treatment                                      Reviewed court records: yes       Assessment Details   Total duration spent with the patient: 18 min     CPT code(s) utilized: 59017 - Psychotherapy (with patient) - 30 (16-37*) min    Dioni Spaulding, [facility], Psychotherapist  [facility] - Triage & Transition Services  Callback: [phone]

## 2025-01-28 NOTE — PLAN OF CARE
Van Ochoa  January 28, 2025  Plan of Care Hand-off Note     Patient Recommended Care Path: inpatient mental health    Clinical Substantiation:  Following the recent presential inaugaration, the patient mental health began unraveling. He is not oriented to person, place, time, or date. Patient is transgender male and he has been fearful of what will occur with this new president. The patient's significant other (Jimbo) reports that Van has severe insomnia due to a mental health disorder. He has history for autism, psychosis, depression, and ADHD. Van hasn't slept for multiple days and is not within his normal baseline. Patient was not engaged in crisis assessment and made multiple random one word statements that were not related to the discussion. During triage he made suicidal comments that he was going to shoot himself with a gun. The significant other reports that Van does not regularly taking his meds, and is out of his meds as he is in between psychiatry. The patient has limited appetite, not showering, and not caring for himself. He spends alot of time during the day and night Stimming. Pt is recommended for inpatient mental health for stabilization and safety.    Goals for crisis stabilization:  increase sleep, safety monitoring, increase coherence of medications.    Next steps for Care Team:  Monitory for possible sympotms of catanoia and suicidal ideation.    Treatment Objectives Addressed:  assessing safety, identifying additional supports, orienting the patient to therapy, rapport building    Therapeutic Interventions:  Engaged in guided discovery, explored patient's perspectives and helped expand them through socratic dialogue.    Has a specific means been identified for suicidal.homicide actions: Yes  If yes, describe: He stated he would use a firearm  Explain action steps toward mitigation: Jimbo (significant other) reports there are no firearms in the home and Van does not have access to a  gun.  Document completion of mitigation action: No firearms in the home, per SO.  The follow up action still needed prior to discharge: Safety planning with patient and SO    Patient coping skills attempted to reduce the crisis:  Pt has been seeking out support from his partners. Pt frequently Stimms. He has been utilizing services of [facility].       Imminent risk of harm: Suicidal Behavior  Severe psychiatric, behavioral or other comorbid conditions are appropriate for management at inpatient mental health as indicated by at least one of the following: Impaired impulse control, judgement, or insight  Severe dysfunction in daily living is present as indicated by at least one of the following: Complete withdrawal from all social interactions, Complete neglect of self care with associated impairment in physical status  Situation and expectations are appropriate for inpatient care: Patient management/treatment at lower level of care is not feasible or is inappropriate, Around-the-clock medical and nursing care to address symptoms and initiate intervention is required  Inpatient mental health services are necessary to meet patient needs and at least one of the following: Specific condition related to admission diagnosis is present and judged likely to further improve at proposed level of care, Specific condition related to admission diagnosis is present and judged likely to deteriorate in absence of treatment at proposed level of care      Collateral contact information:  Jimmy Kim, partner, PH: [phone]    Legal Status: Voluntary/Patient has signed consent for treatment                                Reviewed court records: yes     Psychiatry Consult:   Patient could benefit from immediate psychiatric consult.     Dioni Spaulding, LICSW

## 2025-01-28 NOTE — ED TRIAGE NOTES
Pt presents with  who states he has been confused and not sleeping.  Pt stating random words, reports he is suicidal with plans and intentions to obtain a gun and shoot himself with it.     Triage Assessment (Adult)       Row Name 01/28/25 1007          Triage Assessment    Airway WDL WDL        Respiratory WDL    Respiratory WDL WDL        Skin Circulation/Temperature WDL    Skin Circulation/Temperature WDL WDL        Cardiac WDL    Cardiac WDL WDL        Peripheral/Neurovascular WDL    Peripheral Neurovascular WDL WDL        Cognitive/Neuro/Behavioral WDL    Cognitive/Neuro/Behavioral WDL X  see note

## 2025-01-28 NOTE — TELEPHONE ENCOUNTER
S: [facility] ED , DEC  Angel  calling at 12:43 PM  about a 35 year old/Transgender Male presenting with SI w/ a plan     B: Pt arrived via  Roommate/Partner . Presenting problem, stressors: Pt discharged 2 days ago from ED. He presents with disorganized thinking and speech. He has not been sleeping or taking showers. Pt has limited appetite and is not engaging in daily activities. Pt endorses SI w/ plan to use a gun, no access to gun. Pt has been off meds and presents with loose association. MH started deteriorating after Presidential Inauguration.    Pt affect in ED: Calm, Cooperative , and Disorganized  Pt Dx: Unspecified Psychosis, ADHD, and Autism Spectrum Disorder  Previous IPMH hx? Yes: a couple years ago  Pt endorses SI with a plan to use a gun, no access to gun.    Hx of suicide attempt? Yes: Pt and Partner are unable to provide details.  Pt denies SIB  Pt denies HI   Pt denies hallucinations .   Pt RARS Score: 3    Hx of aggression/violence, sexual offenses, legal concerns, Epic care plan? describe: None  Current concerns for aggression this visit? No  Does pt have a history of Civil Commitment? No  Is Pt their own guardian? Yes    Pt is not prescribed medication. Is patient medication compliant?  Has not seen Psychiatrist in 2 months and refuses meds d/t increasing depression  Pt endorses OP services: Psychiatrist and [facility] Worker  CD concerns: Actively using/consuming Marijuana daily to manage back pain.  Acute or chronic medical concerns: Fibromyalgia and back pain. Had back surgery in the past.  Does Pt present with specific needs, assistive devices, or exclusionary criteria? None      Pt is ambulatory  Pt is able to perform ADLs independently      A: Pt to be reviewed for [facility] admission. Pt is Voluntary  Preferred placement: [facility]    COVID Symptoms: No  If yes, COVID test required   Utox: Ordered, not yet collected   CMP: Ordered, not yet collected   CBC: Ordered, not yet collected       R:  Patient cleared and ready for behavioral bed placement: Yes  Pt placed on IP worklist? Yes    Does Patient need a Transfer Center request created? Yes, writer completed Transfer Center request at: 12:58 PM.

## 2025-01-28 NOTE — TELEPHONE ENCOUNTER
R: [place]  Access Inpatient Bed Call Log 1/28/2025 8:10:41 AM: Intake has called facilities that have not updated the bed status within the last 12 hours.           (Adults                      [facility] is posting 0 beds.             [facility] is posting 7 beds. [phone]: 8:19AM NO BEDS AVAILABLE.  [facility] is posting 0 beds. Negative covid required.   [facility] is posting 0 beds. Neg covid. No [facility]. [phone] 8:20 AM PER ANGE, AT [facility].  [facility] is posting 0 beds. [phone]   [facility] is posting 0 bed. [phone]    [facility] is posting 0 beds.    [facility] is posting 0 beds [phone]           Pt remains on the work list pending appropriate bed availability.

## 2025-01-28 NOTE — ED PROVIDER NOTES
Emergency Department Note      History of Present Illness     Chief Complaint   Depression and Altered Mental Status      HPI   Van Ochoa is a 35 year old adult with a history of ASD, psychosis, ADHD, and suicidal ideation, who presents to the emergency department today accompanied by his significant other for evaluation of depression and altered mental status. The patient's significant other reports that Van was at the ED on 1/25/2025 for insomnia due to a mental health disorder. Van hadn't slept for 4 days, and his anxiety was increasing. While at the hospital he did sleep for 7 hours, however, he has not slept since. The significant other brought Van back into the ED, where he made suicidal comments that he was going to shoot himself with a gun. He reports that Van does not regularly taking his meds, and is out of his meds as he is in between psychiatry.     Upon evaluation Van reports he does drink alcohol and has a history of self harm, though he has made no recent attempt to harm himself. He denies any pain in his abdomen or head.     Independent Historian   Significant Other as detailed above.    Review of External Notes   Emergency Department notes from this facility dated 1/25/2025 in 1/26/2025 where he presented with insomnia and mental health complaints and was observed had improvement and was discharged home.  He has not been taking his medications.    Past Medical History     Medical History and Problem List   Mood disorder   Attention deficit hyperactivity disorder   Anxiety  Pityriasis rosea  Hyperlipidemia, mixed  Abnormal uterine bleeding  Serum creatinine raised  Polycythemia  Obesity  Hypothyroidism  Hypertension  Gender dysphoria in adult  Fatty liver  Asthma  Autism spectrum disorder  Psychosis  Sandra bullosa  Suicidal Ideation    Medications   amLODIPine   buPROPion   cetirizine   DULoxetine   Escitalopram Oxalate   famotidine   labetalol   levothyroxine   losartan   lurasidone    pantoprazole  testosterone cypionate    Surgical History   Tonsillectomy  Mastectomy (bilateral)  Lumbar fusion (L4/L5)  Adenoidectomy    Physical Exam     Patient Vitals for the past 24 hrs:   BP Temp Pulse Resp SpO2   01/28/25 1007 (!) 178/128 98.6  F (37  C) 90 20 96 %     Physical Exam  Constitutional: Well appearing.  HEENT: Atraumatic.   Moist mucous membranes.  Neck: Soft.  Supple.   Cardiac: Regular rate and rhythm.  No murmur or rub.  Respiratory: Clear to auscultation bilaterally.  No respiratory distress.  No wheezing, rhonchi, or rales.  Abdomen: Soft and nontender.  No guarding.  Nondistended.  Musculoskeletal: No edema.  Normal range of motion.  Neurologic: Alert and oriented to self, place, significant other, and hospital.  Normal tone and bulk.   5/5 strength in bilateral upper and lower extremities.  Sensation to light touch intact throughout.    Skin: No rashes.  No edema.  Psych: Exhibiting psychosis.  Answers questions appropriately at times otherwise blurts out nonsensical answers.  Does not appear to be responding terminal stimuli.      Diagnostics     Lab Results   Labs Ordered and Resulted from Time of ED Arrival to Time of ED Departure   COMPREHENSIVE METABOLIC PANEL - Abnormal       Result Value    Sodium 138      Potassium 3.8      Carbon Dioxide (CO2) 23      Anion Gap 14      Urea Nitrogen 19.2      Creatinine 1.60 (*)     GFR Estimate 57 (*)     Calcium 9.1      Chloride 101      Glucose 76      Alkaline Phosphatase 85      AST 26      ALT 23      Protein Total 7.7      Albumin 4.6      Bilirubin Total 0.9     CBC WITH PLATELETS AND DIFFERENTIAL - Abnormal    WBC Count 11.2 (*)     RBC Count 5.78      Hemoglobin 15.8      Hematocrit 46.7      MCV 81      MCH 27.3      MCHC 33.8      RDW 13.7      Platelet Count 343      % Neutrophils 76      % Lymphocytes 16      % Monocytes 7      % Eosinophils 0      % Basophils 0      % Immature Granulocytes 0      NRBCs per 100 WBC 0      Absolute  Neutrophils 8.5 (*)     Absolute Lymphocytes 1.8      Absolute Monocytes 0.8      Absolute Eosinophils 0.0      Absolute Basophils 0.0      Absolute Immature Granulocytes 0.1      Absolute NRBCs 0.0     ETHYL ALCOHOL LEVEL - Normal    Alcohol ethyl <0.01         Imaging   No orders to display       EKG       Independent Interpretation   None    ED Course      Medications Administered   Medications - No data to display    Procedures   Procedures     Discussion of Management   ED Mental Health,      ED Course   ED Course as of 01/28/25 1233   Tue Jan 28, 2025   1050 I obtained history and examined the patient as noted above.         Additional Documentation  None    Medical Decision Making / Diagnosis     CMS Diagnoses: None    MIPS       None    MDM   Van Ochoa is a 35 year old adult who is afebrile and hemodynamically stable.  He is exhibiting acute psychosis.  He is neurologically intact.  He has some nonsensical answers but otherwise answers questions appropriately at times.  Does not appear intoxicated.  Lab workup is unrevealing.  He is medically cleared for mental health evaluation and admission.  He was evaluated by DEC was endorsing admission.  Patient in agreement.  Have ordered his home meds.  We have begun the bed search process.  He is signed over to my oncoming colleague, the routine end of my shift.    Disposition   Care of the patient was transferred to my colleague Dr. Rehman pending inpatient bed for mental health.     Diagnosis     ICD-10-CM    1. Acute psychosis (H)  F23       2. Suicidal ideation  R45.851              Scribe Disclosure:  IPa, am serving as a scribe at 10:58 AM on 1/28/2025 to document services personally performed by Brad Tang MD based on my observations and the provider's statements to me.     IKeke, am serving as a scribe at 12:33 PM on 1/28/2025 to document services personally performed by Brad Tang MD based on my observations and  the provider's statements to me.        Brad Tang MD  01/28/25 3051

## 2025-01-28 NOTE — TELEPHONE ENCOUNTER
R:[place]  Access Inpatient Bed Call Log 1/28/2025 @4:00 PM:  Intake has called facilities that have not updated the bed status within the last 12 hours.         (Adults);                  [facility] is posting 0 beds.             [facility] is posting 7 beds. [phone]: 8:19AM NO BEDS AVAILABLE.  [facility] is posting 0 beds. Negative covid required.   [facility] is posting 0 beds. Neg covid. No high school/[facility]-psych. [phone] 8:20 AM PER ANGE, AT CAPACITY.  [place] is posting 0 beds. [phone]   [facility] is posting 0 bed. [phone]    [facility] is posting 6 beds. (Ages 18-35) Negative covid. [phone] 8:13AM PER LIV, ONLY HAVE ADOL LOW ACUITY.  [facility] is posting 0 beds.    [facility] is posting 0 beds [phone]     Pt remains on the work list pending appropriate bed availability.

## 2025-01-29 ENCOUNTER — TELEPHONE (OUTPATIENT)
Dept: BEHAVIORAL HEALTH | Facility: CLINIC | Age: 35
End: 2025-01-29
Payer: COMMERCIAL

## 2025-01-29 PROCEDURE — 99284 EMERGENCY DEPT VISIT MOD MDM: CPT | Performed by: PHYSICIAN ASSISTANT

## 2025-01-29 PROCEDURE — 250N000013 HC RX MED GY IP 250 OP 250 PS 637: Performed by: PHYSICIAN ASSISTANT

## 2025-01-29 PROCEDURE — 250N000013 HC RX MED GY IP 250 OP 250 PS 637: Performed by: EMERGENCY MEDICINE

## 2025-01-29 RX ORDER — LORAZEPAM 2 MG/1
2 TABLET ORAL EVERY 4 HOURS PRN
Status: DISCONTINUED | OUTPATIENT
Start: 2025-01-29 | End: 2025-01-30 | Stop reason: HOSPADM

## 2025-01-29 RX ORDER — POLYETHYLENE GLYCOL 3350 17 G
2 POWDER IN PACKET (EA) ORAL
Status: DISCONTINUED | OUTPATIENT
Start: 2025-01-29 | End: 2025-01-30 | Stop reason: HOSPADM

## 2025-01-29 RX ORDER — OLANZAPINE 10 MG/1
10 TABLET, ORALLY DISINTEGRATING ORAL 3 TIMES DAILY PRN
Status: DISCONTINUED | OUTPATIENT
Start: 2025-01-29 | End: 2025-01-30

## 2025-01-29 RX ADMIN — LURASIDONE HYDROCHLORIDE 20 MG: 20 TABLET, FILM COATED ORAL at 09:02

## 2025-01-29 RX ADMIN — FAMOTIDINE 20 MG: 20 TABLET, FILM COATED ORAL at 09:02

## 2025-01-29 RX ADMIN — LOSARTAN POTASSIUM 25 MG: 25 TABLET, FILM COATED ORAL at 09:03

## 2025-01-29 RX ADMIN — ESCITALOPRAM OXALATE 20 MG: 10 TABLET ORAL at 09:02

## 2025-01-29 RX ADMIN — AMLODIPINE BESYLATE 5 MG: 5 TABLET ORAL at 09:03

## 2025-01-29 RX ADMIN — LEVOTHYROXINE SODIUM 100 MCG: 100 TABLET ORAL at 09:03

## 2025-01-29 RX ADMIN — LABETALOL HYDROCHLORIDE 100 MG: 100 TABLET, FILM COATED ORAL at 20:13

## 2025-01-29 RX ADMIN — LORAZEPAM 2 MG: 2 TABLET ORAL at 20:13

## 2025-01-29 RX ADMIN — LABETALOL HYDROCHLORIDE 100 MG: 100 TABLET, FILM COATED ORAL at 09:03

## 2025-01-29 RX ADMIN — AMLODIPINE BESYLATE 5 MG: 5 TABLET ORAL at 20:13

## 2025-01-29 RX ADMIN — LORAZEPAM 2 MG: 2 TABLET ORAL at 14:00

## 2025-01-29 RX ADMIN — BUPROPION HYDROCHLORIDE 150 MG: 150 TABLET, EXTENDED RELEASE ORAL at 09:03

## 2025-01-29 RX ADMIN — DULOXETINE HYDROCHLORIDE 30 MG: 30 CAPSULE, DELAYED RELEASE ORAL at 09:03

## 2025-01-29 ASSESSMENT — ACTIVITIES OF DAILY LIVING (ADL)
ADLS_ACUITY_SCORE: 41

## 2025-01-29 NOTE — TELEPHONE ENCOUNTER
R: [place]  Access Inpatient Bed Call Log  1/29/2025 12:45 AM  Intake has called facilities that have not updated their bed status within the last 12 hours.     *METRO:  [place] -- [facility]: @ Capacity.  [facility]: @ Posting 7 beds. Reporting no reviews overnight.  [place] -- [facility]: @ Capacity. Low acuity  [place] -- [facility]: @ Capacity. Low acuity only -11:52 PM [place], they are capped.  [place] -- [facility]: @ Capacity.  [facility]: @ Capacity.  [facility]/ beds: @ Posting 6 beds. Ages 18-35, Voluntary only, NO aggression/physical/sexual assault, violence hx or drug abuse, or psychosis. Negative Covid - 11:46 PM Per [place], YA: 1, Adol: 1-M and 1-F, Child: 0.  [place] -- [facility]: @ Capacity.  [place] -- [facility]: @ Capacity.  [place] -- [facility]: @ Capacity. Do not review overnight.     Pt remains on waitlist pending appropriate placement availability.

## 2025-01-29 NOTE — ED NOTES
I brought patient in a paper pant to put on along with a basin of soapy water to wash himself up in, He did wash his arm and play in the water. He put the pant on after about 15 minutes.

## 2025-01-29 NOTE — ED NOTES
Van woke up and he asked for a glass of water.  Jimmy, his partner is helping him to go to the bathroom and he will be leaving soon.  Jimmy, has to go home to take care of the other partner.

## 2025-01-29 NOTE — ED NOTES
Patient is on the security camera in the Emergency Department, we noticed that he ripped his curtains off the window. Security went into the room and removed the curtains from the floor & room for patient safety.

## 2025-01-29 NOTE — ED NOTES
"Disorganized, depressed, unable to get into a conversation, walking with his eyes closed,  I reminded him he needs to open his eyes, which he did, when ambulating to bathroom.  Removed all his clothes, encourage him to wear clothes, outfit provided to patient.  Reports stressors the new Trump administration, he also wants to see Blanka who is the other partner.   Patient is here with his partner Jimmy.   Offered food, he declined  \" I am too depressed\"  Medications offered.  "

## 2025-01-29 NOTE — TELEPHONE ENCOUNTER
R: [place]  Access Inpatient Bed Call Log 1/29/25 at 8:15 AM: Intake has called facilities that have not updated the bed status within the last 12 hours.                   [facility] is at capacity.           [facility] is posting 0 beds. [phone] per call @8:38am, no beds and can try back this evening.  [facility] is posting 0 beds. Negative covid required.  [facility] is posting 0 beds. Neg covid. No high school/[facility]-psych.   [place] is posting 0 beds. [phone]  [facility] is posting 0 beds. [phone]   [facility] is posting 2 beds. Negative covid. Per call @8:24am PT IS NOT AGE APPROPRIATE   [facility] is posting 0 beds [phone].        Pt remains on the work list pending appropriate bed availability.

## 2025-01-29 NOTE — ED NOTES
Bed: [facility]  Expected date:   Expected time:   Means of arrival:   Comments:  Room 20

## 2025-01-29 NOTE — CONSULTS
"      Initial Psychiatric Consult   Consult date: January 29, 2025         Reason for Consult, requesting source:      Requesting source: Ross Courtney    Labs and imaging reviewed. Patient seen and evaluated by Praveen Webb PA-C          HPI:   See DEC assessment: Consults by Dioni Spaulding LICSW (01/28/2025 12:20 PM)     Psychiatry consulted for medication management  On assessment patient is grossly disorganized. Unable to respond coherently to questions. Able to say they are at \"psychiatry\". States they are here due to \"skyler\".    Partner is present during assessment and reports that since the inauguration patients stress level has been very elevated. He stopped sleeping shortly after that. Got about 7 hours of sleep after presenting to ED on 1/25. Has continued to decline since then. Reports that patient has not done well with mood stabilizers in the past, and has had persistent challenges with depression. One previous episode of psychosis several years ago, which he was not present for, but their other partner (Raquel?) was, and might be able to provide more details.          Past Psychiatric History:           Substance Use and History:           Past Medical History:   PAST MEDICAL HISTORY:   Past Medical History:   Diagnosis Date    ADHD     Anxiety     Asthma     Depression     Environmental allergies     Gastroesophageal reflux disease     Gender dysphoria in adult     Hypertension     Hypothyroid     Polycythemia        PAST SURGICAL HISTORY:   Past Surgical History:   Procedure Laterality Date    ENT SURGERY      Tonsillectomy    IR LUMBAR PUNCTURE  8/30/2018    IR LUMBAR PUNCTURE  8/7/2018    TRANSGENDER MASTECTOMY Bilateral 7/7/2017    Procedure: TRANSGENDER MASTECTOMY;  BILATERAL MASTECTOMY ;  Surgeon: Daphne Ibrahim MD;  Location: [facility]             Family History:   FAMILY HISTORY: No family history on file.    Family Psychiatric History:         Social History:   SOCIAL HISTORY: "   Social History     Tobacco Use    Smoking status: Former    Smokeless tobacco: Never   Substance Use Topics    Alcohol use: Yes                Physical ROS:   The 10 point Review of Systems is negative other than noted in the HPI or here.           Medications:     Current Facility-Administered Medications   Medication Dose Route Frequency Provider Last Rate Last Admin    amLODIPine (NORVASC) tablet 5 mg  5 mg Oral BID Brad Tang MD   5 mg at 01/29/25 0903    buPROPion (WELLBUTRIN XL) 24 hr tablet 150 mg  150 mg Oral QAM Brad Tang MD   150 mg at 01/29/25 0903    DULoxetine (CYMBALTA) DR capsule 30 mg  30 mg Oral Daily Brad Tang MD   30 mg at 01/29/25 0903    escitalopram (LEXAPRO) tablet 20 mg  20 mg Oral Daily Brad Tang MD   20 mg at 01/29/25 0902    famotidine (PEPCID) tablet 20 mg  20 mg Oral Daily Brad Tang MD   20 mg at 01/29/25 0902    labetalol (NORMODYNE) tablet 100 mg  100 mg Oral BID Brad Tang MD   100 mg at 01/29/25 0903    levothyroxine (SYNTHROID/LEVOTHROID) tablet 100 mcg  100 mcg Oral Daily Brad Tang MD   100 mcg at 01/29/25 0903    losartan (COZAAR) tablet 25 mg  25 mg Oral Daily Brad Tang MD   25 mg at 01/29/25 0903    lurasidone (LATUDA) tablet 20 mg  20 mg Oral Daily with breakfast Brad Tang MD   20 mg at 01/29/25 0902              Allergies:     Allergies   Allergen Reactions    Atomoxetine Other (See Comments)     Worsening depression     Banana      Itchy throat      Lisinopril Other (See Comments)     Elevated Creatinine    Morphine      itchy    Morphine Itching    Adhesive Tape Rash          Labs:     Recent Results (from the past 48 hours)   Comprehensive metabolic panel    Collection Time: 01/28/25  9:04 AM   Result Value Ref Range    Sodium 138 135 - 145 mmol/L    Potassium 3.8 3.4 - 5.3 mmol/L    Carbon Dioxide (CO2) 23 22 - 29 mmol/L    Anion Gap 14 7 - 15 mmol/L    Urea Nitrogen 19.2 6.0 - 20.0 mg/dL     Creatinine 1.60 (H) 0.51 - 1.17 mg/dL    GFR Estimate 57 (L) >60 mL/min/1.73m2    Calcium 9.1 8.8 - 10.4 mg/dL    Chloride 101 98 - 107 mmol/L    Glucose 76 70 - 99 mg/dL    Alkaline Phosphatase 85 40 - 150 U/L    AST 26 0 - 45 U/L    ALT 23 0 - 70 U/L    Protein Total 7.7 6.4 - 8.3 g/dL    Albumin 4.6 3.5 - 5.2 g/dL    Bilirubin Total 0.9 <=1.2 mg/dL   Ethyl Alcohol Level    Collection Time: 01/28/25  9:04 AM   Result Value Ref Range    Alcohol ethyl <0.01 <=0.01 g/dL   CBC with platelets and differential    Collection Time: 01/28/25  9:04 AM   Result Value Ref Range    WBC Count 11.2 (H) 4.0 - 11.0 10e3/uL    RBC Count 5.78 3.80 - 5.90 10e6/uL    Hemoglobin 15.8 11.7 - 17.7 g/dL    Hematocrit 46.7 35.0 - 53.0 %    MCV 81 78 - 100 fL    MCH 27.3 26.5 - 33.0 pg    MCHC 33.8 31.5 - 36.5 g/dL    RDW 13.7 10.0 - 15.0 %    Platelet Count 343 150 - 450 10e3/uL    % Neutrophils 76 %    % Lymphocytes 16 %    % Monocytes 7 %    % Eosinophils 0 %    % Basophils 0 %    % Immature Granulocytes 0 %    NRBCs per 100 WBC 0 <1 /100    Absolute Neutrophils 8.5 (H) 1.6 - 8.3 10e3/uL    Absolute Lymphocytes 1.8 0.8 - 5.3 10e3/uL    Absolute Monocytes 0.8 0.0 - 1.3 10e3/uL    Absolute Eosinophils 0.0 0.0 - 0.7 10e3/uL    Absolute Basophils 0.0 0.0 - 0.2 10e3/uL    Absolute Immature Granulocytes 0.1 <=0.4 10e3/uL    Absolute NRBCs 0.0 10e3/uL   Extra Red Top Tube    Collection Time: 01/28/25  1:41 PM   Result Value Ref Range    Hold Specimen x    Urine Drug Screen Panel    Collection Time: 01/28/25  3:40 PM   Result Value Ref Range    Amphetamines Urine Screen Negative Screen Negative    Barbituates Urine Screen Negative Screen Negative    Benzodiazepine Urine Screen Negative Screen Negative    Cannabinoids Urine Screen Positive (A) Screen Negative    Cocaine Urine Screen Negative Screen Negative    Fentanyl Qual Urine Screen Negative Screen Negative    Opiates Urine Screen Negative Screen Negative    PCP Urine Screen Negative Screen  "Negative          Physical and Psychiatric Examination:     BP (!) 145/90   Pulse 85   Temp 98  F (36.7  C) (Oral)   Resp 16   Ht 1.702 m (5' 7\")   Wt 99.7 kg (219 lb 12.8 oz)   SpO2 98%   BMI 34.43 kg/m    Weight is 219 lbs 12.78 oz  Body mass index is 34.43 kg/m .    Physical Exam:  I have reviewed the physical exam as documented by by the medical team and agree with findings and assessment and have no additional findings to add at this time.    Mental Status Exam:    Appearance: awake, alert  Attitude:  cooperative  Eye Contact:  poor   Mood:  anxious  Affect:  intensity is flat  Speech:  paucity of speech  Language: Fluent in english   Psychomotor Behavior:  fidgeting  Thought Process:  disorganized  Associations:  loosening of associations present  Thought Content:  patient appears to be responding to internal stimuli  Insight:  limited  Judgement:  limited  Oriented to:  Place, person  Attention Span and Concentration:  poor  Recent and Remote Memory:  poor  Fund of Knowledge: Appropriate   Gait and Station:                DSM-5 Diagnosis:   Bipolar disorder          Assessment:   Patient is currently grossly psychotic, and requires admission to inpatient psychiatry for safety and stabilization. Currently voluntary, but given level of disorganization is clearly not able to care for self at this time and would meet criteria for involuntary hold if requesting discharge.   Agreeable to PRN medication to help with sleep and clearing thoughts          Summary of Recommendations:   Initiate zyprexa 10mg TID PRN, Ativan 2mg Q4  Transfer to inpatient psychiatry when appropriate bed is available.       Praveen Webb PA-C             "

## 2025-01-29 NOTE — PROGRESS NOTES
Patient has been pacing all day, unable to sit sill, I gave Lorazepam at 14:00 now patient is resting comfortably.

## 2025-01-29 NOTE — ED PROVIDER NOTES
Sign Out Note    I took over care of this patient from Dr. Tang    Briefly, patient presented to the ED for: psych    Plan at time of sign out: boarding for inpatient psych bed    Events during my shift: no acute events on my afternoon shift.    I anticipate signing patient out to Dr. Jolly at 2000 shift change.    MD Shira Ratliff, Rudy Ruiz MD  01/28/25 1910

## 2025-01-30 ENCOUNTER — HOSPITAL ENCOUNTER (INPATIENT)
Facility: CLINIC | Age: 35
LOS: 5 days | Discharge: HOME | DRG: 885 | End: 2025-02-04
Attending: PSYCHIATRY & NEUROLOGY | Admitting: PSYCHIATRY & NEUROLOGY
Payer: MEDICARE

## 2025-01-30 ENCOUNTER — TELEPHONE (OUTPATIENT)
Dept: BEHAVIORAL HEALTH | Facility: CLINIC | Age: 35
End: 2025-01-30
Payer: COMMERCIAL

## 2025-01-30 VITALS
RESPIRATION RATE: 16 BRPM | HEIGHT: 67 IN | BODY MASS INDEX: 34.5 KG/M2 | SYSTOLIC BLOOD PRESSURE: 156 MMHG | TEMPERATURE: 97.7 F | HEART RATE: 72 BPM | OXYGEN SATURATION: 100 % | DIASTOLIC BLOOD PRESSURE: 103 MMHG | WEIGHT: 219.8 LBS

## 2025-01-30 DIAGNOSIS — E03.9 HYPOTHYROIDISM, UNSPECIFIED TYPE: ICD-10-CM

## 2025-01-30 DIAGNOSIS — F31.60 BIPOLAR AFFECTIVE DISORDER, MIXED (H): ICD-10-CM

## 2025-01-30 DIAGNOSIS — R68.2 DRY MOUTH: ICD-10-CM

## 2025-01-30 DIAGNOSIS — I10 HYPERTENSION, UNSPECIFIED TYPE: Primary | ICD-10-CM

## 2025-01-30 DIAGNOSIS — K21.9 GASTROESOPHAGEAL REFLUX DISEASE WITHOUT ESOPHAGITIS: ICD-10-CM

## 2025-01-30 DIAGNOSIS — Z91.09 ENVIRONMENTAL ALLERGIES: ICD-10-CM

## 2025-01-30 DIAGNOSIS — F41.9 ANXIETY: ICD-10-CM

## 2025-01-30 PROBLEM — R45.851 SUICIDAL IDEATION: Status: ACTIVE | Noted: 2025-01-30

## 2025-01-30 PROCEDURE — 99283 EMERGENCY DEPT VISIT LOW MDM: CPT | Performed by: PHYSICIAN ASSISTANT

## 2025-01-30 PROCEDURE — 250N000013 HC RX MED GY IP 250 OP 250 PS 637: Performed by: NURSE PRACTITIONER

## 2025-01-30 PROCEDURE — 250N000013 HC RX MED GY IP 250 OP 250 PS 637: Performed by: EMERGENCY MEDICINE

## 2025-01-30 PROCEDURE — 250N000013 HC RX MED GY IP 250 OP 250 PS 637: Performed by: PHYSICIAN ASSISTANT

## 2025-01-30 PROCEDURE — 124N000002 HC R&B MH UMMC

## 2025-01-30 RX ORDER — OLANZAPINE 5 MG/1
15 TABLET ORAL 2 TIMES DAILY
Status: DISCONTINUED | OUTPATIENT
Start: 2025-01-30 | End: 2025-01-30 | Stop reason: HOSPADM

## 2025-01-30 RX ORDER — DULOXETIN HYDROCHLORIDE 30 MG/1
30 CAPSULE, DELAYED RELEASE ORAL DAILY
Status: DISCONTINUED | OUTPATIENT
Start: 2025-01-31 | End: 2025-02-04 | Stop reason: HOSPADM

## 2025-01-30 RX ORDER — LOSARTAN POTASSIUM 25 MG/1
25 TABLET ORAL DAILY
Status: DISCONTINUED | OUTPATIENT
Start: 2025-01-31 | End: 2025-02-04 | Stop reason: HOSPADM

## 2025-01-30 RX ORDER — OLANZAPINE 10 MG/2ML
10 INJECTION, POWDER, FOR SOLUTION INTRAMUSCULAR 2 TIMES DAILY PRN
Status: DISCONTINUED | OUTPATIENT
Start: 2025-01-30 | End: 2025-01-31

## 2025-01-30 RX ORDER — OLANZAPINE 10 MG/1
10 TABLET ORAL 2 TIMES DAILY PRN
Status: DISCONTINUED | OUTPATIENT
Start: 2025-01-30 | End: 2025-01-31

## 2025-01-30 RX ORDER — LEVOTHYROXINE SODIUM 25 UG/1
100 TABLET ORAL DAILY
Status: DISCONTINUED | OUTPATIENT
Start: 2025-01-31 | End: 2025-02-04 | Stop reason: HOSPADM

## 2025-01-30 RX ORDER — ESCITALOPRAM OXALATE 20 MG/1
20 TABLET ORAL DAILY
Status: DISCONTINUED | OUTPATIENT
Start: 2025-01-31 | End: 2025-01-31

## 2025-01-30 RX ORDER — AMOXICILLIN 250 MG
1 CAPSULE ORAL 2 TIMES DAILY PRN
Status: DISCONTINUED | OUTPATIENT
Start: 2025-01-30 | End: 2025-02-04 | Stop reason: HOSPADM

## 2025-01-30 RX ORDER — TRAZODONE HYDROCHLORIDE 50 MG/1
50 TABLET ORAL
Status: DISCONTINUED | OUTPATIENT
Start: 2025-01-30 | End: 2025-01-31

## 2025-01-30 RX ORDER — AMLODIPINE BESYLATE 5 MG/1
5 TABLET ORAL 2 TIMES DAILY
Status: DISCONTINUED | OUTPATIENT
Start: 2025-01-31 | End: 2025-02-04 | Stop reason: HOSPADM

## 2025-01-30 RX ORDER — ACETAMINOPHEN 325 MG/1
650 TABLET ORAL EVERY 4 HOURS PRN
Status: DISCONTINUED | OUTPATIENT
Start: 2025-01-30 | End: 2025-02-04 | Stop reason: HOSPADM

## 2025-01-30 RX ORDER — CETIRIZINE HYDROCHLORIDE 10 MG/1
10 TABLET ORAL DAILY PRN
Status: DISCONTINUED | OUTPATIENT
Start: 2025-01-30 | End: 2025-02-04 | Stop reason: HOSPADM

## 2025-01-30 RX ORDER — LABETALOL 100 MG/1
100 TABLET, FILM COATED ORAL 2 TIMES DAILY
Status: DISCONTINUED | OUTPATIENT
Start: 2025-01-31 | End: 2025-02-04 | Stop reason: HOSPADM

## 2025-01-30 RX ORDER — POLYETHYLENE GLYCOL 3350 17 G
2 POWDER IN PACKET (EA) ORAL
Status: DISCONTINUED | OUTPATIENT
Start: 2025-01-30 | End: 2025-02-04 | Stop reason: HOSPADM

## 2025-01-30 RX ORDER — LURASIDONE HYDROCHLORIDE 20 MG/1
20 TABLET, FILM COATED ORAL DAILY
Status: DISCONTINUED | OUTPATIENT
Start: 2025-01-31 | End: 2025-01-31

## 2025-01-30 RX ORDER — OLANZAPINE 15 MG/1
15 TABLET ORAL 2 TIMES DAILY
Status: DISCONTINUED | OUTPATIENT
Start: 2025-01-30 | End: 2025-01-31

## 2025-01-30 RX ORDER — FAMOTIDINE 20 MG/1
20 TABLET, FILM COATED ORAL DAILY
Status: DISCONTINUED | OUTPATIENT
Start: 2025-01-31 | End: 2025-02-04 | Stop reason: HOSPADM

## 2025-01-30 RX ORDER — MAGNESIUM HYDROXIDE/ALUMINUM HYDROXICE/SIMETHICONE 120; 1200; 1200 MG/30ML; MG/30ML; MG/30ML
30 SUSPENSION ORAL EVERY 4 HOURS PRN
Status: DISCONTINUED | OUTPATIENT
Start: 2025-01-30 | End: 2025-02-04 | Stop reason: HOSPADM

## 2025-01-30 RX ORDER — LORAZEPAM 1 MG/1
1 TABLET ORAL EVERY 4 HOURS PRN
Status: DISCONTINUED | OUTPATIENT
Start: 2025-01-30 | End: 2025-02-04 | Stop reason: HOSPADM

## 2025-01-30 RX ADMIN — LABETALOL HYDROCHLORIDE 100 MG: 100 TABLET, FILM COATED ORAL at 19:38

## 2025-01-30 RX ADMIN — OLANZAPINE 15 MG: 5 TABLET, FILM COATED ORAL at 19:38

## 2025-01-30 RX ADMIN — OLANZAPINE 10 MG: 10 TABLET, ORALLY DISINTEGRATING ORAL at 01:31

## 2025-01-30 RX ADMIN — LOSARTAN POTASSIUM 25 MG: 25 TABLET, FILM COATED ORAL at 08:22

## 2025-01-30 RX ADMIN — OLANZAPINE 15 MG: 5 TABLET, FILM COATED ORAL at 11:31

## 2025-01-30 RX ADMIN — AMLODIPINE BESYLATE 5 MG: 5 TABLET ORAL at 08:22

## 2025-01-30 RX ADMIN — FAMOTIDINE 20 MG: 20 TABLET, FILM COATED ORAL at 08:22

## 2025-01-30 RX ADMIN — LURASIDONE HYDROCHLORIDE 20 MG: 20 TABLET, FILM COATED ORAL at 08:22

## 2025-01-30 RX ADMIN — LABETALOL HYDROCHLORIDE 100 MG: 100 TABLET, FILM COATED ORAL at 08:22

## 2025-01-30 RX ADMIN — ESCITALOPRAM OXALATE 20 MG: 10 TABLET ORAL at 08:21

## 2025-01-30 RX ADMIN — OLANZAPINE 15 MG: 15 TABLET, FILM COATED ORAL at 21:18

## 2025-01-30 RX ADMIN — BUPROPION HYDROCHLORIDE 150 MG: 150 TABLET, EXTENDED RELEASE ORAL at 08:22

## 2025-01-30 RX ADMIN — LORAZEPAM 2 MG: 2 TABLET ORAL at 00:29

## 2025-01-30 RX ADMIN — DULOXETINE HYDROCHLORIDE 30 MG: 30 CAPSULE, DELAYED RELEASE ORAL at 08:22

## 2025-01-30 RX ADMIN — LEVOTHYROXINE SODIUM 100 MCG: 100 TABLET ORAL at 08:22

## 2025-01-30 RX ADMIN — AMLODIPINE BESYLATE 5 MG: 5 TABLET ORAL at 19:38

## 2025-01-30 ASSESSMENT — ACTIVITIES OF DAILY LIVING (ADL)
ADLS_ACUITY_SCORE: 41
ADLS_ACUITY_SCORE: 23
ADLS_ACUITY_SCORE: 41
ADLS_ACUITY_SCORE: 26
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
## 2025-01-30 NOTE — TELEPHONE ENCOUNTER
6:16PM - Called [facility] again. Edith reports she does not have MH beds to reserve in APS but suggests calling back later    R: [place] Access [place]patient [place] Call Log 1/29/25  @3:10 PM:  Intake has called facilities that have not updated the bed status within the last 12 hours.        [facility] is at capacity.            [facility] is posting 7 beds. [phone]. Per Edith @ 5:10PM, very limited beds available in shared rooms (would have to be roommate appropriate)   [facility] is posting 0 beds. Negative covid required.   [facility] is posting 0 beds. Neg covid. No high school/[facility]-psych. Per Joy @ 5:11PM, they are full   [facility] is posting 0 beds. [phone]   [facility] is posting 0 beds. [phone]   [facility] is posting 6 Young Adult beds. Negative covid. Per Kevin @ 5:12PM, they are reviewing for their last bed   [facility] is posting 0 beds [phone].    Pt remains on the work list pending appropriate bed availability.

## 2025-01-30 NOTE — TELEPHONE ENCOUNTER
R: [place]  [place]patient [place] Call Log 1/30/2025 @ 7:49 am:        METRO     3:36 PM Paged [facility] to review for unit 32     3:48 PM [facility] accepted pt to unit 32     3:52 PM Intake called unit 32 - spoke with CRN informed pt is in queue     4:14 PM Intake called ED with bed placement information         [facility] is posting 0 beds.                   [facility] is posting 7 beds. [phone]: per call at 7:49 am to [facility], call back after 8:30 am.    [facility] is posting 0 beds.[phone]; Negative covid required. Per call at 7:51 am, prompt asked if we would like a call back and author hit the correct prompt asking for a call back.    [facility] is posting 0 beds. Neg covid. No high school/[facility]-psych. [phone]. Per call at 7:54 am to [facility], they are at cap.     [facility] is posting 0 beds. [phone]; Per call at 7:51 am, prompt asked if we would like a call back and author hit the correct prompt asking for a call back.         [facility] is posting 0 beds. [phone]; per call at 7:55 am to Lev, they are at cap.        [facility] is posting 6 beds. (Ages 18-35) Negative covid, no aggression, physical or sexual assault, violence hx or drug abuse, or psychosis.  [phone]; per call at 7:58 am to Kevin, they have 1 y/a bed avail.  PT IS NOT AGE APPROPRIATE   [facility] is posting 0 beds. Per call at 7:51 am, prompt asked if we would like a call back and author hit the correct prompt asking for a call back.       [facility] is posting 0 beds [phone]; Per call at 7:51 am, prompt asked if we would like a call back and author hit the correct prompt asking for a call back.     Pt remains on the work list pending appropriate bed availability.

## 2025-01-30 NOTE — ED NOTES
Patient was given his dinner tray notes that he is very tired, patient asked where afshan was and afshan told us to tell him that he went home to take care of the family. This was relayed to the patient

## 2025-01-30 NOTE — CONSULTS
Psychiatry Consultation; Follow up              Reason for Consult, requesting source:      Requesting source: Emmanuel Orosco    Labs and imaging reviewed,     Total time spent in chart review, patient interview and coordination of care;            Interim history:    Patient doing a bit better today. More organized. Continues to report poor sleep however.         Current Medications:     Current Facility-Administered Medications   Medication Dose Route Frequency Provider Last Rate Last Admin    amLODIPine (NORVASC) tablet 5 mg  5 mg Oral BID Brad Tang MD   5 mg at 01/30/25 0822    DULoxetine (CYMBALTA) DR capsule 30 mg  30 mg Oral Daily Brad Tang MD   30 mg at 01/30/25 0822    escitalopram (LEXAPRO) tablet 20 mg  20 mg Oral Daily Brad Tang MD   20 mg at 01/30/25 0821    famotidine (PEPCID) tablet 20 mg  20 mg Oral Daily Brad Tang MD   20 mg at 01/30/25 0822    labetalol (NORMODYNE) tablet 100 mg  100 mg Oral BID Brad Tang MD   100 mg at 01/30/25 0822    levothyroxine (SYNTHROID/LEVOTHROID) tablet 100 mcg  100 mcg Oral Daily Brad Tang MD   100 mcg at 01/30/25 0822    losartan (COZAAR) tablet 25 mg  25 mg Oral Daily Brad Tang MD   25 mg at 01/30/25 0822    lurasidone (LATUDA) tablet 20 mg  20 mg Oral Daily with breakfast Brad Tang MD   20 mg at 01/30/25 0822    OLANZapine (zyPREXA) tablet 15 mg  15 mg Oral BID Praveen Webb PA-C                  MSE:   Appearance: awake, alert  Attitude:  cooperative  Eye Contact:  fair  Mood:  anxious  Affect:  slightly restricted  Speech:  decreased prosody  Psychomotor Behavior:  evidence of tics and physical retardation  Muscle strength and tone: intact   Thought Process:  disorganized  Associations:  loosening of associations present  Thought Content:  passive suicidal ideation present  Insight:  fair  Judgement:  limited  Oriented to:  time, person, and place  Attention Span and Concentration:   "limited  Recent and Remote Memory:  limited    Vital signs:  Temp: 98  F (36.7  C)   BP: (!) 142/105 Pulse: 82   Resp: 16 SpO2: 98 % O2 Device: None (Room air)   Height: 170.2 cm (5' 7\") Weight: 99.7 kg (219 lb 12.8 oz)  Estimated body mass index is 34.43 kg/m  as calculated from the following:    Height as of this encounter: 1.702 m (5' 7\").    Weight as of this encounter: 99.7 kg (219 lb 12.8 oz).    Qtc:          DSM-5 Diagnosis:   Bipolar disorder          Assessment:   Patient continued to have poor sleep last night. Given their previous ED presentation last week, with 1 nights rest and moderate improvement, followed by prompt decompensation, discharge is ill advised. Patient continues to be agreeable to admission to inpatient psychiatry with partner's encouragement, but his goal remains to return home. He is agreeable to transfer to [facility] for further monitoring. Patient only received one dose of PRN zyprexa yesterday at 01:30. Wellbutrin was continued, which could contribute to increased energy/poor sleep, so will stop that now.     Continues to warrant further vlad servation and medication adjustments.             Summary of Recommendations:   Schedule Zyprexa 15mg BID  Halt wellbutrin  Continue plan for admission to inpatient psychiatry, initiate transfer to [facility] pending bed availability.       \"Much or all of the text in this note was generated through the use of Dragon Dictate voice to text software. Errors in spelling or words which appear to be out of contact are unintentional, may be present due having escaped editing\"           "

## 2025-01-30 NOTE — ED NOTES
Van is awake, disorganized , he makes signs with his hands, does not answer questions.  He slept for a good 4 hours and now he is awake.  Plan  Inpatient.

## 2025-01-30 NOTE — ED NOTES
[facility]  ED to EMPATH Checklist:      Goal for EMPATH: Depression management and Time to stabilize    Current Behavior: Restless and Cooperative    Safety Concerns: None    Legal Hold Status: Voluntary    Medically Cleared by ED provider: Yes    Patient Therapeutically Searched: Therapeutic search by ED staff (strings, belts, shoes, pockets, electronics, etc.)    Belongings: In room locker    Independent Ambulation at Baseline: Yes/No: Yes    Participates in Care/Conversation: Yes/No: Yes    Patient Informed about EMPATH: Yes/No: Yes    DEC: Ordered and completed    Patient Ready to be Transferred to EMPATH? Yes/No: Yes

## 2025-01-30 NOTE — TELEPHONE ENCOUNTER
[place]: [place]  [place]patient [place] Call [place]  1/30/25 @ 12:30am   Intake has called facilities that have not updated their bed status within the last 12 hours.     ADULTS  *METRO:  [place] [facility]: @ capacity.  [facility]: POSTING 7 BEDS. No reviews overnight.  [place] -- [facility]: @ cap per website. Low acuity  [place] [facility]: @ cap per website. Low acuity only.  [place] -- [facility]: @ cap per website.  [facility]: @ cap per website.   [facility] beds: POSTING 6 BEDS- Per [place], have 0 beds. Ages 18-35, Voluntary only, NO aggression/physical or sexual assault/violence hx, or drug abuse. Negative Covid   [place] -- [facility]: @ cap per website.   [place] -- [facility]: @ cap per website.  [place] -- [facility]: @ cap per website. Do not review overnight.      Pt remains on waitlist pending appropriate placement availability.

## 2025-01-30 NOTE — ED PROVIDER NOTES
Assumed care from Dr. Courtney, patient continues to convalesce in the emergency department waiting for an appropriate mental health bed.  There were no acute events throughout the duration of my shift.  Care was turned over to my partner Dr. Jolly.     TriggerDenzel MD  01/29/25 [id number]

## 2025-01-31 LAB
ALBUMIN SERPL BCG-MCNC: 4.8 G/DL (ref 3.5–5.2)
ALP SERPL-CCNC: 82 U/L (ref 40–150)
ALT SERPL W P-5'-P-CCNC: 29 U/L (ref 0–70)
ANION GAP SERPL CALCULATED.3IONS-SCNC: 15 MMOL/L (ref 7–15)
AST SERPL W P-5'-P-CCNC: 35 U/L (ref 0–45)
BASOPHILS # BLD AUTO: 0 10E3/UL (ref 0–0.2)
BASOPHILS NFR BLD AUTO: 0 %
BILIRUB SERPL-MCNC: 0.8 MG/DL
BUN SERPL-MCNC: 18.2 MG/DL (ref 6–20)
CALCIUM SERPL-MCNC: 7.7 MG/DL (ref 8.8–10.4)
CHLORIDE SERPL-SCNC: 100 MMOL/L (ref 98–107)
CREAT SERPL-MCNC: 1.43 MG/DL (ref 0.51–1.17)
EGFRCR SERPLBLD CKD-EPI 2021: 65 ML/MIN/1.73M2
EOSINOPHIL # BLD AUTO: 0.1 10E3/UL (ref 0–0.7)
EOSINOPHIL NFR BLD AUTO: 1 %
ERYTHROCYTE [DISTWIDTH] IN BLOOD BY AUTOMATED COUNT: 13.6 % (ref 10–15)
EST. AVERAGE GLUCOSE BLD GHB EST-MCNC: 97 MG/DL
GLUCOSE SERPL-MCNC: 87 MG/DL (ref 70–99)
HBA1C MFR BLD: 5 %
HCO3 SERPL-SCNC: 23 MMOL/L (ref 22–29)
HCT VFR BLD AUTO: 47.1 % (ref 35–53)
HGB BLD-MCNC: 16.1 G/DL (ref 11.7–17.7)
IMM GRANULOCYTES # BLD: 0.1 10E3/UL
IMM GRANULOCYTES NFR BLD: 1 %
LYMPHOCYTES # BLD AUTO: 1.6 10E3/UL (ref 0.8–5.3)
LYMPHOCYTES NFR BLD AUTO: 21 %
MCH RBC QN AUTO: 27.5 PG (ref 26.5–33)
MCHC RBC AUTO-ENTMCNC: 34.2 G/DL (ref 31.5–36.5)
MCV RBC AUTO: 80 FL (ref 78–100)
MONOCYTES # BLD AUTO: 0.8 10E3/UL (ref 0–1.3)
MONOCYTES NFR BLD AUTO: 10 %
NEUTROPHILS # BLD AUTO: 5.2 10E3/UL (ref 1.6–8.3)
NEUTROPHILS NFR BLD AUTO: 67 %
NRBC # BLD AUTO: 0 10E3/UL
NRBC BLD AUTO-RTO: 0 /100
PLATELET # BLD AUTO: 330 10E3/UL (ref 150–450)
POTASSIUM SERPL-SCNC: 3.9 MMOL/L (ref 3.4–5.3)
PROT SERPL-MCNC: 7.7 G/DL (ref 6.4–8.3)
RBC # BLD AUTO: 5.86 10E6/UL (ref 3.8–5.9)
SODIUM SERPL-SCNC: 138 MMOL/L (ref 135–145)
T4 FREE SERPL-MCNC: 1.51 NG/DL (ref 0.9–1.7)
TSH SERPL DL<=0.005 MIU/L-ACNC: 5.21 UIU/ML (ref 0.3–4.2)
WBC # BLD AUTO: 7.8 10E3/UL (ref 4–11)

## 2025-01-31 PROCEDURE — 99223 1ST HOSP IP/OBS HIGH 75: CPT | Performed by: NURSE PRACTITIONER

## 2025-01-31 PROCEDURE — 250N000013 HC RX MED GY IP 250 OP 250 PS 637: Performed by: NURSE PRACTITIONER

## 2025-01-31 PROCEDURE — 84443 ASSAY THYROID STIM HORMONE: CPT | Performed by: NURSE PRACTITIONER

## 2025-01-31 PROCEDURE — 83036 HEMOGLOBIN GLYCOSYLATED A1C: CPT | Performed by: NURSE PRACTITIONER

## 2025-01-31 PROCEDURE — 84439 ASSAY OF FREE THYROXINE: CPT | Performed by: NURSE PRACTITIONER

## 2025-01-31 PROCEDURE — 36415 COLL VENOUS BLD VENIPUNCTURE: CPT | Performed by: NURSE PRACTITIONER

## 2025-01-31 PROCEDURE — 250N000011 HC RX IP 250 OP 636: Mod: JW | Performed by: NURSE PRACTITIONER

## 2025-01-31 PROCEDURE — 124N000002 HC R&B MH UMMC

## 2025-01-31 PROCEDURE — 85025 COMPLETE CBC W/AUTO DIFF WBC: CPT | Performed by: NURSE PRACTITIONER

## 2025-01-31 PROCEDURE — 97150 GROUP THERAPEUTIC PROCEDURES: CPT | Mod: GO

## 2025-01-31 PROCEDURE — 80053 COMPREHEN METABOLIC PANEL: CPT | Performed by: NURSE PRACTITIONER

## 2025-01-31 RX ORDER — OLANZAPINE 10 MG/1
10 TABLET ORAL 3 TIMES DAILY PRN
Status: DISCONTINUED | OUTPATIENT
Start: 2025-01-31 | End: 2025-02-04 | Stop reason: HOSPADM

## 2025-01-31 RX ORDER — HYDRALAZINE HYDROCHLORIDE 25 MG/1
25 TABLET, FILM COATED ORAL EVERY 6 HOURS PRN
Status: DISCONTINUED | OUTPATIENT
Start: 2025-01-31 | End: 2025-02-04 | Stop reason: HOSPADM

## 2025-01-31 RX ORDER — TESTOSTERONE CYPIONATE 200 MG/ML
70 INJECTION, SOLUTION INTRAMUSCULAR WEEKLY
Status: DISCONTINUED | OUTPATIENT
Start: 2025-01-31 | End: 2025-02-04 | Stop reason: HOSPADM

## 2025-01-31 RX ORDER — QUETIAPINE FUMARATE 100 MG/1
100 TABLET, FILM COATED ORAL AT BEDTIME
Status: DISCONTINUED | OUTPATIENT
Start: 2025-01-31 | End: 2025-02-03

## 2025-01-31 RX ORDER — OLANZAPINE 10 MG/2ML
10 INJECTION, POWDER, FOR SOLUTION INTRAMUSCULAR 3 TIMES DAILY PRN
Status: DISCONTINUED | OUTPATIENT
Start: 2025-01-31 | End: 2025-02-04 | Stop reason: HOSPADM

## 2025-01-31 RX ORDER — LURASIDONE HYDROCHLORIDE 40 MG/1
40 TABLET, FILM COATED ORAL EVERY EVENING
Status: DISCONTINUED | OUTPATIENT
Start: 2025-01-31 | End: 2025-02-03

## 2025-01-31 RX ORDER — QUETIAPINE FUMARATE 100 MG/1
100 TABLET, FILM COATED ORAL
Status: DISCONTINUED | OUTPATIENT
Start: 2025-01-31 | End: 2025-02-04 | Stop reason: HOSPADM

## 2025-01-31 RX ORDER — PANTOPRAZOLE SODIUM 40 MG/1
40 TABLET, DELAYED RELEASE ORAL DAILY
Status: DISCONTINUED | OUTPATIENT
Start: 2025-01-31 | End: 2025-02-04 | Stop reason: HOSPADM

## 2025-01-31 RX ADMIN — QUETIAPINE FUMARATE 100 MG: 100 TABLET ORAL at 19:33

## 2025-01-31 RX ADMIN — FAMOTIDINE 20 MG: 20 TABLET ORAL at 08:57

## 2025-01-31 RX ADMIN — LABETALOL HYDROCHLORIDE 100 MG: 100 TABLET, FILM COATED ORAL at 19:33

## 2025-01-31 RX ADMIN — PANTOPRAZOLE SODIUM 40 MG: 40 TABLET, DELAYED RELEASE ORAL at 08:57

## 2025-01-31 RX ADMIN — AMLODIPINE BESYLATE 5 MG: 5 TABLET ORAL at 08:57

## 2025-01-31 RX ADMIN — LOSARTAN POTASSIUM 25 MG: 25 TABLET, FILM COATED ORAL at 08:57

## 2025-01-31 RX ADMIN — LABETALOL HYDROCHLORIDE 100 MG: 100 TABLET, FILM COATED ORAL at 11:02

## 2025-01-31 RX ADMIN — LORAZEPAM 1 MG: 1 TABLET ORAL at 02:24

## 2025-01-31 RX ADMIN — LEVOTHYROXINE SODIUM 100 MCG: 25 TABLET ORAL at 08:57

## 2025-01-31 RX ADMIN — TESTOSTERONE CYPIONATE 70 MG: 200 INJECTION INTRAMUSCULAR at 09:10

## 2025-01-31 RX ADMIN — AMLODIPINE BESYLATE 5 MG: 5 TABLET ORAL at 19:33

## 2025-01-31 RX ADMIN — ESCITALOPRAM OXALATE 20 MG: 20 TABLET ORAL at 08:57

## 2025-01-31 RX ADMIN — DULOXETINE HYDROCHLORIDE 30 MG: 30 CAPSULE, DELAYED RELEASE ORAL at 08:57

## 2025-01-31 RX ADMIN — QUETIAPINE FUMARATE 100 MG: 100 TABLET ORAL at 23:56

## 2025-01-31 RX ADMIN — LURASIDONE HYDROCHLORIDE 40 MG: 40 TABLET, FILM COATED ORAL at 19:38

## 2025-01-31 ASSESSMENT — ACTIVITIES OF DAILY LIVING (ADL)
HYGIENE/GROOMING: INDEPENDENT
ADLS_ACUITY_SCORE: 23
ORAL_HYGIENE: INDEPENDENT
ADLS_ACUITY_SCORE: 23
LAUNDRY: WITH SUPERVISION
ADLS_ACUITY_SCORE: 23
DRESS: INDEPENDENT

## 2025-01-31 NOTE — PLAN OF CARE
"  Problem: Psychotic Signs/Symptoms  Goal: Improved Sleep (Psychotic Signs/Symptoms)  Outcome: Not Progressing     RN reported pt came out of his room and started touching walls, doors, and touched a staff member on the forehead. Pt was redirected away from staff by other RN and reminded of boundaries and unit rules. Pt then reported anxiety and restlessness to this writer. Pt offered ativan at 0224 hours and pt accepted. Pt presents as disorganized and making delusional statements. Pt stated he needed a shadow and \"I am trying to find myself, I am in the cosmos.\" Pt observed putting blanket around his head and pt stated \"I am trying to stop my mind\". Pt asked questions about stopping his mind and pt declined to elaborate. Pt intermittently out of room and seeking staff. Pt encouraged to rest and redirected frequently as pt stops in front of other patients' doors and stares in room. Pt laying down at this time after encouraged to rest.    "

## 2025-01-31 NOTE — PLAN OF CARE
Problem: Suicide Risk  Goal: Absence of Self-Harm  Outcome: Progressing   Goal Outcome Evaluation:    Situation    Ochoa Van is a 35 year old transgender male admitted to station 32 on 1/30/25 2050. Van was seen at [facility] ED on 1/28 for significant behavioral change with SI. He stated that he wanted to shoot himself with a gun. Pt displays symptoms of confusion with disorganized thinking on assessment. Inpatient hospitalization was recommended for stabilization. He is a voluntary patient.    Background  Van admitting diagnosis is SI with disorganized thinking. He has a history of autism, psychosis, depression, anxiety, ASD, and ADHD. He does not keep up with taking his psychotic medications, reported taking 4 out of 7 days a week.  Prior admission to the ED he had not slept for 4 days.     Assessment  Van was not able to participate in assessment. He had difficulty answering questions. It was observed that he had disorganized thoughts.  he stated that he can't process anything during the interview, and he wanted to rest.     Recommendation  Pt should be encouraged to take his medications regularly for stabilization of his symptoms.    Vital signs:  Temp: 97.5  F (36.4  C) Temp src: Temporal BP: (!) 152/105 Pulse: 71     SpO2: 98 % O2 Device: None (Room air)
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
## 2025-01-31 NOTE — PLAN OF CARE
BEH IP Unit Acuity Rating Score (UARS)  Patient is given one point for every criteria they meet.    CRITERIA SCORING   On a 72 hour hold, court hold, committed, stay of commitment, or revocation. 0    Patient LOS on BEH unit exceeds 20 days. 0  LOS: 1   Patient under guardianship, 55+, otherwise medically complex, or under age 11. 0   Suicide ideation without relief of precipitating factors. 1   Current plan for suicide. 0   Current plan for homicide. 0   Imminent risk or actual attempt to seriously harm another without relief of factors precipitating the attempt. 0   Severe dysfunction in daily living (ex: complete neglect for self care, extreme disruption in vegetative function, extreme deterioration in social interactions). 1   Recent (last 7 days) or current physical aggression in the ED or on unit. 0   Restraints or seclusion episode in past 72 hours. 0   Recent (last 7 days) or current verbal aggression, agitation, yelling, etc., while in the ED or unit. 0   Active psychosis. 1   Need for constant or near constant redirection (from leaving, from others, etc).  0   Intrusive or disruptive behaviors. 0   Patient requires 3 or more hours of individualized nursing care per 8-hour shift (i.e. for ADLs, meds, therapeutic interventions). 0   TOTAL 3

## 2025-01-31 NOTE — PLAN OF CARE
Pt stated he does not want fasting labs done and wants a snack. Pt encouraged to fast and pt declined. Snack of yogurt and orange juice given at approximately 0300 hours.

## 2025-01-31 NOTE — PLAN OF CARE
Goal Outcome Evaluation:    Plan of Care Reviewed With: patient      Problem: Psychotic Signs/Symptoms  Goal: Improved Behavioral Control (Psychotic Signs/Symptoms)  Outcome: Progressing  Pt was up on the unit, social with peers and staff. He stated that he slept well last night. He denied pain or discomfort, denied SI HI and he contracted for safety. His BP in the morning was high, 160/107 and pulse of 81. In the afternoon it was 122/83 and a pulse of 83. Pt was medication compliant and he denied side effects. Pt had adequate intake of both meals. Will continue to monitor.
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
## 2025-01-31 NOTE — PROGRESS NOTES
CLINICAL NUTRITION SERVICES - BRIEF NOTE    RECOMMENDATIONS FOR MDs/PROVIDERS TO ORDER:  None at this time    Recommendations already ordered by Registered Dietitian (RD):  Roast beef/turkey/cheese sandwich + strawberry yogurt @ 8pm    Future/Additional Recommendations:  RD to sign off at this time, but will remain available by consult if new nutrition problem arises.       REASON FOR ASSESSMENT  Van Ochoa is a/an 35 year old adult assessed by the dietitian for Provider Order - Latuda causes him to feel too tired to take during the daytime so he takes it in the evening.  It has to be taken with at least 350 calories for absoprtion.  Please orders snacks appropriate to facilitate this.    Findings  Provided snack options, pt agreeable to scheduled HS snack to aid absorption of Latuda.     INTERVENTIONS  Implementation  Modify composition of meals/snacks     Follow up/Monitoring  RD to sign off at this time, but will remain available by consult if new nutrition problem arises.      Kecia Michle MPH, RDN, LD  Behavioral Health Adult & Pediatric Dietitian  BEH Clinical Dietitian Vocera, Teams, or Desk: [phone]  Weekend/Holiday Vocera: Weekend Holiday Clinical Dietitian [Multi Site Groups]

## 2025-01-31 NOTE — PLAN OF CARE
[facility] H&P/Consult:    Patient: Adal Fritz Date: 8/15/2022   : 1960 Attending: Jaki Malone MD         Admission date: 2022    ICU admit date:  08/15/22  Intubation date:  N/A    Chief Complaint: Left side weakness     Adal Fritz is a 61 year old male with moderate aortic regurgitation, hyperdynamic intra atrial septum with PFO presented to ED with left side weakness, last known well 09:30 pm . On arrival to ED he was noted to have NIHSS 7. CT head perfusion and CTA head and neck was done negative for perfusion limitation or LVO. He was evaluated by tele neruology and tPA was administered, noted to have significant improvement in NIHSS post tPA administration. ACCS consulted for admission to neuro ICU post tPA monitoring. Patient reported feeling groggy earlier during the day also tripped around 6 pm but he was confident his stroke like symptoms started around 9:30 pm. Denies fever, chills, nausea, vomiting, abdominal pain, chest pain or dizziness.     Impression:  -- Acute ischemic stroke versus TIA  -- s/p tPA administration   -- Moderate aortic regurgitation,   -- Hyperdynamic intra atrial septum with PFO       ASSESSMENT/PLAN:    Neuro: s/p tPA administration   Neuro checks as per protocol  SBP goal <180 dbp<105 post tPA as per protocol   CT head and CTA negative   Will order repeat TTE, may need AJAY in the setting of AI  LE doppler patient has known PFO  ASA, and statins   Repeat head imaging in 24 hours   Keep NPO  PT/OT/speech to see     Pulmonary: Encourage pulmonary hygiene   Saturating well on RA     CV: Moderate aortic regurgitation   Continue PTA lisinopril  Repeating TTE     Renal: ns@ 100 ml/r  Monitor renal function and urine output     GI: Keep NPO   Speech eval    Heme: Mild anemia  S/P tPA monitor for bleeding     Endocrine: Blood glucose as per ICU protocol     ID: No concern for infection     Goals/Disposition:   -- Is the patient expected to  Pt appeared to sleep 3 hours. Pt presents as disorganized, delusional, and intrusive at times. Safety checks done at least every 15 minutes. Pt refusing fasting labs and pt at two snacks this shift.        require at least a two midnight stay in the hospital? Yes -  I certify that I expect inpatient services for greater than two midnights are medically necessary for this patient.  Please see H&P and MD Progress Notes for additional information about the patient's course of treatment.     PERTINENT DIAGNOSTICS/PROCEDURES:      BEST PRACTICES:  - VTE prophylaxis: SCDs  - SUP: none  - LDA: piv  - Nutrition: NPO now   - Therapy/mobilization: PT/OT  - Goals of care note documented: Full code     ================================================================    HPI: Adal Fritz is a 61 year old male with moderate aortic regurgitation, hyperdynamic intra atrial septum with PFO presented to ED with left side weakness, last known well 09:30 pm 08/14. On arrival to ED he was noted to have NIHSS 7. CT head perfusion and CTA head and neck was done negative for perfusion limitation or LVO. He was evaluated by tele neruology and tPA was administered, noted to have significant improvement in NIHSS post tPA administration. ACCS consulted for admission to neuro ICU post tPA monitoring. Patient reported feeling groggy earlier during the day also tripped around 6 pm but he was confident his stroke like symptoms started around 9:30 pm. Denies fever, chills, nausea, vomiting, abdominal pain, chest pain or dizziness.     PMHx:   History reviewed. No pertinent past medical history.   History reviewed. No pertinent surgical history.  (Not in a hospital admission)    ALLERGIES:  No Known Allergies   Social History     Tobacco Use   • Smoking status: Never Smoker   • Smokeless tobacco: Never Used   Substance Use Topics   • Alcohol use: Not on file      History reviewed. No pertinent family history.     ================================================================    ROS: Negative except as noted in HPI     No intake/output data recorded.  No intake/output data recorded.    Vital Last Value 24 Hour Range   Temperature 97.5 °F (36.4 °C)  (08/14/22 2350) Temp  Min: 97.5 °F (36.4 °C)  Max: 97.5 °F (36.4 °C)   Pulse 63 (08/15/22 0055) Pulse  Min: 63  Max: 70   Respiratory (!) 22 (08/15/22 0055) Resp  Min: 13  Max: 22   Non-Invasive  Blood Pressure (!) 141/78 (08/15/22 0055) BP  Min: 131/75  Max: 155/84   Pulse Oximetry 98 % (08/15/22 0005) SpO2  Min: 97 %  Max: 99 %   Arterial   Blood Pressure   No data recorded        Physical Exam:  General: Laying in bed, not in acute distress  Neuro: AAOX3, GCS 15, left facial droop, b/l upper and lower extremities 5/5  Neck: supple, no JVD  Chest: B/L clear to ausculation, no wheeze or rhonchi  Heart: s1s2 heard, no s3 or murmur   Abdomen: soft, ND,NT,BS+  Extremities: No edema, pedal pulses intact  Skin: warm and moist     Pertinent Reviewed: Allergies, Medications, Labs, Imaging and Physician and Nursing Notes    ACCS Attestation        The patient is critically ill and requires high complexity decision making for assessment and support including: frequent evaluation and titration of therapies; extensive interpretation of multiple databases; application of advanced monitoring technologies and assessment and treatment of complex metabolic derangement. I evaluated the patient and reviewed imaging and laboratory data. Critical care services I provided [id number] > 32 minutes not including time allocated for procedures. Time spent is exclusive of time spent by other providers.     Trevor Hairston MD  Intensivist  [facility]

## 2025-01-31 NOTE — PLAN OF CARE
01/30/25 2120   Patient Belongings   Patient Belongings locker  (Shampoo, conditioner, coat, shirt (2), sweatpants, underwear, stuffed toy (2), slippers, crocs, misc snacks, mountain dew, charging coat (2))   Patient Belongings Put in Hospital Secure Location (Security or Locker, etc.) clothing;shoes   Belongings Search Yes   Clothing Search Yes     Goal Outcome Evaluation:    Locker 2/1/25: Boxes of markers in ziploc bag, coloring book     A               Admission:  I am responsible for any personal items that are not sent to the safe or pharmacy.  [facility] is not responsible for loss, theft or damage of any property in my possession.    Signature:  _________________________________ Date: _______  Time: _____                                              Staff Signature:  ____________________________ Date: ________  Time: _____      2nd Staff person, if patient is unable/unwilling to sign:    Signature: ________________________________ Date: ________  Time: _____     Discharge:  [facility] has returned all of my personal belongings:    Signature: _________________________________ Date: ________  Time: _____                                          Staff Signature:  ____________________________ Date: ________  Time: _____

## 2025-01-31 NOTE — PHARMACY-ADMISSION MEDICATION HISTORY
Admission Medication History completed by pharmacist, Boaz Donaldson on 1/25/25. Please see Pharmacy - Admission Medication History note under previous encounter at [facility]. for information regarding prior to admission medications.    Katty Simons, PharmD   Clinical Pharmacist

## 2025-01-31 NOTE — PROGRESS NOTES
"  Rehab Group    Start time: 915  End time: 1045  Patient time total: 10minutes    attended partial group     #3 attended   Group Type: OT Clinic   Group Topic Covered: balanced lifestyle, coping skills, healthy leisure time, and problem solving     Group Session Detail:  Occupational Therapy Clinic group to facilitate coping skill exploration, use of cognitive skills and problem solving, creative expression, clinical observation and facilitation of social, cognitive, and kinesthetic performance skills.       Patient Response/Contribution:  positive affect, cooperative with task, socially appropriate, safe use of materials/supplies, and distracted      Patient Detail:  Arrived to group, was given and completed a written self assessment. Identified RFA as: \"I'm not sure what it was that triggered things but I do know a big part of it was due to the election.\"  Further noted experiencing: anxiety, anger, feeling helpless, guilt, despair, negative acing thoughts, cognitive issues, blanking out, self harm thoughts, obsessive thoughts, withdrawal from others, restlessness and trouble using and finding supports. Identified coping skills as crafts/projects. Goals selected included: learning to make choices, learn about diagnosis, management of anxiety, anger and stress management. Supports listed as: family, Farida and Josi.\"  OT purpose was explained with a value of having involvement in tx plan, and provided options to meet self identified goals. Will assess further in the areas of organization, problem solving, and concentration. Upon completing assessment noted he was tired and needed to go lie down. Returned at end of group with coloring pages. Within 2 minutes noted  he was overwhelmed  and couldn't focus.   Will continue to encourage and support group attendance and participation for exploration of healthy coping and self management.       [id number] OT Group (2 or more in attendance)      Patient Active Problem List "   Diagnosis    Mood disorder in conditions classified elsewhere    Conversion disorder    Attention deficit hyperactivity disorder (ADHD)    Anxiety    Pityriasis rosea    Hyperlipidemia, mixed    Abnormal uterine bleeding    Serum creatinine raised    Polycythemia    Obesity    Hypothyroidism    HTN (hypertension)    Gender dysphoria in adult    Fatty liver    Environmental allergies    Chronic bilateral low back pain with bilateral sciatica    Asthma    Active autistic disorder    Psychosis (H)    Suicidal ideation

## 2025-01-31 NOTE — DISCHARGE INSTRUCTIONS
Behavioral Discharge Planning and Instructions    Summary: You were admitted on 1/30/2025 due to behavior change and insomnia.  You were treated by MARCY Cannon CNP for ongoing psychiatric assessment and medication management.  You had opportunities to participate in therapeutic groups on the unit. You were discharged on 2/4/2025 from [facility] to your home located at [id number] [place] [address] [address] [facility] [address].     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    Health Care Follow-up:   Physical Therapy Wednesday February 5th at 10:30 AM  [facility]  [address] [facility] [address]  Phone: [phone]  Fax: [phone]    Preoperative Exam Thursday February 6th at 1:00 PM  ADRIANNA Sauer ChB with [facility]  [address] [facility] [place] [address]   Phone [phone]     Otolaryngology Surgery Wednesday February 12th at 12:45 PM  Radha Villalba MD with [facility]  Please call and verify address   Phone: [phone]    Otolaryngology follow up Monday February 17th at 3:30 PM  Nessa Lucas PA-C at [facility] [address], [place] [address]  Phone: [phone]    Adult Day Treatment Assessment/Intake Friday February 21st at 11:00 AM in office  Adult Day Treatment Assessment/Intake Thursday February 27st at 1:00 PM in office  NIKIA Go with [facility] [address], [address] [place] [address]  Phone [phone]    Psychiatric Medication Management Monday February 24th 3:25 PM  Lucinda Daniels PA-C with [facility]  [address] [address]  Phone [phone] Fax [phone]    Physical Therapy Wednesday February 26th at 10:30 AM  [facility]  [address] [facility] [address]  Phone: [phone]  Fax: [phone]    Physical Therapy March 5th at 10:30 AM  [facility]  [address] [facility] [address]  Phone: [phone]  Fax: [phone]    Otolaryngology follow up Wednesday March 19th at 1:40 PM  Radha Villalba MD at [facility]  [address] [facility] [address], [place] [address]  Phone: [phone]    Attend all scheduled appointments with your outpatient providers. Call at least 24 hours in advance if you need to reschedule an appointment to ensure continued access to your outpatient providers.     You spoke with the unit therapist about new mental health providers. If you would like to change providers within [facility], you will need to talk with your provider to initiate that.  If you are looking for someone outside of [facility], you can research possible practitioners at [email or web address].[employer].[email or web address]/ or https://www.[employer]/    Major Treatments, Procedures and Findings:  You were provided with: a psychiatric assessment, assessed for medical stability, medication evaluation and/or management, group therapy, individual therapy, and milieu management    Symptoms to Report: feeling more aggressive, increased confusion, losing more sleep, mood getting worse, or thoughts of suicide    Early warning signs can include: increased depression or anxiety sleep disturbances increased thoughts or behaviors of suicide or self-harm  increased unusual thinking, such as paranoia or hearing voices    Safety and Wellness:  Take all medicines as directed.  Make no changes unless your doctor suggests them.      Follow treatment recommendations.  Refrain from alcohol and  non-prescribed drugs.  Ask your support system to help you reduce your access to items that could harm yourself or others. If there is a concern for safety, call 911.    Resources:   Mental Health Crisis Resources  Throughout [place]: call **CRISIS (**915078)  Crisis Text Line: is available for free, 24/7 by texting [place] to 365574  Suicide Awareness Voices of Education (SAVE) (www.save.org): 476-074-VCID (6279)  The National Suicide Prevention Lifeline is now: 988 Suicide and Crisis Lifeline. Call 988 anytime.  National [place] on Mental Illness (www.[place].akua.org): 969.799.5996 or 333-887-8449.  Woin8ccoe: text the word LIFE to 03719 for immediate support and crisis intervention  Mental Health Consumer/Survivor Network of [place] (www.mhcsn.net): 601.819.4392 or 754-298-7258  Mental Health Association of [place] (www.mentalhealth.org): 998.416.7673 or 286-119-2835  Peer Support Connection [place] [facility] (PSC) 1-766.363.6494 Available from 5pm - 9am (7 days a week/365 days a year)  [facility] - [phone]  [place] [phone] Community Outreach for Psych Emergencies    General Medication Instructions:   See your medication sheet(s) for instructions.   Take all medicines as directed.  Make no changes unless your doctor suggests them.   Go to all your doctor visits.  Be sure to have all your required lab tests. This way, your medicines can be refilled on time.  Do not use any drugs not prescribed by your doctor.  Avoid alcohol.    Advance Directives:   Scanned document on file with [employer]? No scanned doc  Is document scanned? Pt states no documents  Honoring Choices Your Rights Handout: Informed and given  Was more information offered? Pt unable to request    The Treatment team has appreciated the opportunity to work with you. If you have any questions or concerns about your recent admission, you can contact the unit which can receive your call 24 hours a day, 7 days a week. They will be able to get in touch with  a Provider if needed. The unit number is [phone].

## 2025-01-31 NOTE — PLAN OF CARE
01/31/25 1128   Individualization/Patient Specific Goals   Patient Personal Strengths independent living skills;intellectual cognitive skills;family/social support;relationship stability;resourceful;stable living environment   Patient Vulnerabilities adverse childhood experience(s);lacks insight into illness   Patient/Family-Specific Goals (Include Timeframe) Pt wants to return home as soon as possible.   Interprofessional Rounds   Summary Admit from [facility] due to behavior change and not sleeping. Symptoms have been increasing since Trump's inauguration. Has not been adherent to medication.   Participants advanced practice nurse;CTC;nursing   Behavioral Team Discussion   Participants Chanelle Tillman, APRN, CNP; Camilla [facility]ba [facility]; Heena Joseph, MICHELLE   Progress New admission   Anticipated length of stay 5-7 days   Continued Stay Criteria/Rationale Stabilize on medication   Medical/Physical Transgender male, hormone therapy with testosterone. L4-L5 fusion.   Plan Discharge home with OP supports, explore PHP or Day treatment.   Safety Plan To be completed prior to discharge.   Anticipated Discharge Disposition home with family     PRECAUTIONS AND SAFETY    Behavioral Orders   Procedures    Code 1 - Restrict to Unit    Routine Programming     As clinically indicated    Status 15     Every 15 minutes.    Suicide precautions: Suicide Risk: MODERATE; Clinical rationale to override score: lack of access to a plan for self-harm     Patients on Suicide Precautions should have a Combination Diet ordered that includes a Diet selection(s) AND a Behavioral Tray selection for Safe Tray - with utensils, or Safe Tray - NO utensils       Order Specific Question:   Suicide Risk     Answer:   MODERATE     Order Specific Question:   Clinical rationale to override score:     Answer:   lack of access to a plan for self-harm

## 2025-01-31 NOTE — PLAN OF CARE
Upcoming Meetings and Dates/Important Information  Days since IP admission 1  Team Note Due Friday    Next Steps  IOP/Day treatment referral    Assessment/Intervention/Current Symptoms and Care Coordination  Chart Review  Met with team to discuss patient care.  Scheduled pt with follow up med management appt and updated AVS  Spoke with pt about discharge plans. He wants to go home with Day Treatment. Provided him with information about [facility] and [facility]. CTC will check in with him on Monday and refer to his selected program. He is leaning toward [facility] right now.  He does not need help finding a new therapist, he will do that with his [employer] worker.    Discharge Plan or Goal  Dispo Plan: Return home with Day treatment or PHP  Transportation: [facility] or medical cab  Medication: In hand  Safety plan complete?: Not yet completed  Appointments: All on AVS.     Barriers to Discharge  patient requires further psychiatric stabilization due to current symptomology, medication management with possible adjustments     Referral Status  No new referral made    Legal Status  voluntary but holdable    Contacts  Partner - Jimmy Kim [phone] LEXUS Signed  Partner - Blanka De La Torre [phone] LEXUS Signed  Mother - Farida Ochoa [phone]      [facility] [phone]

## 2025-02-01 LAB
ANION GAP SERPL CALCULATED.3IONS-SCNC: 14 MMOL/L (ref 7–15)
BUN SERPL-MCNC: 22.3 MG/DL (ref 6–20)
CALCIUM SERPL-MCNC: 9.5 MG/DL (ref 8.8–10.4)
CHLORIDE SERPL-SCNC: 97 MMOL/L (ref 98–107)
CHOLEST SERPL-MCNC: 181 MG/DL
CREAT SERPL-MCNC: 1.69 MG/DL (ref 0.51–1.17)
EGFRCR SERPLBLD CKD-EPI 2021: 53 ML/MIN/1.73M2
GLUCOSE SERPL-MCNC: 99 MG/DL (ref 70–99)
HCO3 SERPL-SCNC: 24 MMOL/L (ref 22–29)
HDLC SERPL-MCNC: 27 MG/DL
LDLC SERPL CALC-MCNC: 120 MG/DL
NONHDLC SERPL-MCNC: 154 MG/DL
POTASSIUM SERPL-SCNC: 3.5 MMOL/L (ref 3.4–5.3)
SODIUM SERPL-SCNC: 135 MMOL/L (ref 135–145)
TRIGL SERPL-MCNC: 169 MG/DL

## 2025-02-01 PROCEDURE — 97150 GROUP THERAPEUTIC PROCEDURES: CPT | Mod: GO

## 2025-02-01 PROCEDURE — 36415 COLL VENOUS BLD VENIPUNCTURE: CPT | Performed by: NURSE PRACTITIONER

## 2025-02-01 PROCEDURE — 80061 LIPID PANEL: CPT | Performed by: NURSE PRACTITIONER

## 2025-02-01 PROCEDURE — 250N000013 HC RX MED GY IP 250 OP 250 PS 637: Performed by: NURSE PRACTITIONER

## 2025-02-01 PROCEDURE — 124N000002 HC R&B MH UMMC

## 2025-02-01 PROCEDURE — 80048 BASIC METABOLIC PNL TOTAL CA: CPT | Performed by: NURSE PRACTITIONER

## 2025-02-01 PROCEDURE — 99222 1ST HOSP IP/OBS MODERATE 55: CPT | Performed by: PHYSICIAN ASSISTANT

## 2025-02-01 RX ADMIN — QUETIAPINE FUMARATE 100 MG: 100 TABLET ORAL at 19:36

## 2025-02-01 RX ADMIN — DULOXETINE HYDROCHLORIDE 30 MG: 30 CAPSULE, DELAYED RELEASE ORAL at 08:15

## 2025-02-01 RX ADMIN — FAMOTIDINE 20 MG: 20 TABLET ORAL at 08:15

## 2025-02-01 RX ADMIN — LABETALOL HYDROCHLORIDE 100 MG: 100 TABLET, FILM COATED ORAL at 19:36

## 2025-02-01 RX ADMIN — AMLODIPINE BESYLATE 5 MG: 5 TABLET ORAL at 08:19

## 2025-02-01 RX ADMIN — LEVOTHYROXINE SODIUM 100 MCG: 25 TABLET ORAL at 08:16

## 2025-02-01 RX ADMIN — LURASIDONE HYDROCHLORIDE 40 MG: 40 TABLET, FILM COATED ORAL at 19:36

## 2025-02-01 RX ADMIN — HYDRALAZINE HYDROCHLORIDE 25 MG: 25 TABLET ORAL at 09:12

## 2025-02-01 RX ADMIN — AMLODIPINE BESYLATE 5 MG: 5 TABLET ORAL at 19:36

## 2025-02-01 RX ADMIN — LABETALOL HYDROCHLORIDE 100 MG: 100 TABLET, FILM COATED ORAL at 08:20

## 2025-02-01 RX ADMIN — HYDRALAZINE HYDROCHLORIDE 25 MG: 25 TABLET ORAL at 16:24

## 2025-02-01 RX ADMIN — PANTOPRAZOLE SODIUM 40 MG: 40 TABLET, DELAYED RELEASE ORAL at 08:16

## 2025-02-01 RX ADMIN — LORAZEPAM 1 MG: 1 TABLET ORAL at 21:36

## 2025-02-01 ASSESSMENT — ACTIVITIES OF DAILY LIVING (ADL)
HYGIENE/GROOMING: INDEPENDENT
ADLS_ACUITY_SCORE: 23
ORAL_HYGIENE: INDEPENDENT
ADLS_ACUITY_SCORE: 23
LAUNDRY: WITH SUPERVISION
ADLS_ACUITY_SCORE: 23
HYGIENE/GROOMING: INDEPENDENT
ADLS_ACUITY_SCORE: 23
DRESS: INDEPENDENT
ADLS_ACUITY_SCORE: 23

## 2025-02-01 NOTE — PLAN OF CARE
Goal Outcome Evaluation:    Plan of Care Reviewed With: patient      Problem: Psychotic Signs/Symptoms  Goal: Improved Behavioral Control (Psychotic Signs/Symptoms)  Outcome: Progressing  Pt was out in the milieu most of the shift. He was was calm and controlled, although at times he appeared anxious. He denied SI, HI and he contracted for safety. Pt denied pain or discomfort. Pt's BP was high this morning,163/109 with a pulse of 87. He was given prn hydralazin per order, and the afternoon check was lower, 148/93 and pulse was 81. Pt was seen by medicine for the Hypertension to today, and labs were ordered. See their note on recommendations. He had adequate intake of both meals. Pt was happy to be able to wear own clothing after a shower, and he has also spent time coloring using his personal materials which were dropped here earlier today. Will continue to monitor.

## 2025-02-01 NOTE — PLAN OF CARE
Problem: Adult Behavioral Health Plan of Care  Goal: Plan of Care Review  Outcome: Progressing     Problem: Sleep Disturbance  Goal: Adequate Sleep/Rest  Outcome: Progressing   Goal Outcome Evaluation:         P., c/o difficulty falling asleep and staying asleep. Seroquel 100 mg PRN administered with little relief. Pt kept waking up occasionally and getting out of his room for short period of time before returning to his room. Pt slept for 2 hours. No other concerns noted or verbalized. No other concerns noted or verbalized. Will continue with same plan of care.

## 2025-02-01 NOTE — PLAN OF CARE
"Goal Outcome Evaluation:    Plan of Care Reviewed With: patient          Patient frequently seeks out staff.  He stated his goal for today was to \"get out of the hospital\".  He asked this writer multiple times \"what is the plan?  When can my family come get me and take me home?\"  Writer asked patient if he recalled talking to his CTC today about starting a PHP next week and he says he does but he cannot stay in the hospital another night, \"it is no good for my well being\".  Patient continued to come to the desk asking staff \"has anyone been trying to reach me?\"    Patient wanted to sign a 12 intent the decided to wait until morning because he is afraid of the risk of a 72 hour hold.  Hopeful for discharge tomorrow.    Patient makes hand gestures while talking, writer asked if it was sign language and he responded \"it is my sign language\".  Asked writer \"do you know where my bunny is that I got when me Van was 3 years old, it is a pink eraser\".  Denies SI/SIB/HI.    Mood is anxious and depressed.      Patient showered and trimmed his nails.    Patient evaluation continues.      /85 (BP Location: Left arm, Patient Position: Sitting)   Pulse 79   Temp 97  F (36.1  C) (Temporal)   Ht 1.702 m (5' 7\")   Wt 97.5 kg (215 lb)   SpO2 97%   BMI 33.67 kg/m             "

## 2025-02-01 NOTE — CONSULTS
"[facility]  Consult Note - Hospitalist Service  Date of Admission:  1/30/2025  Consult Requested by: Airam Tillman NP  Reason for Consult: \"MARIANA, hypertension\"    Assessment & Plan   Van Ochoa is a 35 year old adult admitted on 1/30/2025 to inpatient psychiatry due to unspecified psychosis. He has a history of GABRIEL, PTSD, gender dysphoria, mild autism spectrum disorder, ADHD per history, hypertension, GERD, hypothyroidism, chronic pain with chronic medical marijuana, severe ALFONSO    # CKD II-III  Creat 1.4-1.6, which is within baseline creatinine the last 3-4 years of 1.1-1.6, mostly closer to 1.6. BUN frieda today. Unclear etiology of CKD, most obvious etiology upon chart review would be poorly controlled hypertension. Patient states he has been voiding well here, 3 times today. Has been eating well, but historically doesn't drink very much fluid daily. He has only had ~1 cup of fluid thus far today.   - okay to resume losartan  - encourage po intake of 6-8 cups of fluid today, d/w patient and nursing.   - hypertension management as below  - due to rising BUN, recheck BMP once more in am.     # Elevated blood pressure  Patient states he has not taken his medications regularly recently.   - PTA amlodipine 5 mg BID  - PTA labetalol 100 mg BID  - resume PTA losartan 25 mg daily  - PRN hydralazine for SBP >160 and DBP >110  - would recommend treatment for ALFONSO as untreated can exacerbate hypertension - lifestyle interventions, and CPAP if able to tolerate (unfortunately hasn't tolerated due to it causing nightmares recently) - defer ongoing management to PCP.     # ALFONSO  - hasn't tolerated CPAP due to it causing nightmares recently      Medicine service will continue to follow for BMP in am.      The patient's care was discussed with patient, nursing, and psychiatry. Plan of care further communicated to primary team via this note.     BOBBY AbreuC  Hospitalist " Service  Securely message with [employer] (more info)  Text page via [facility] Paging/Directory   ______________________________________________________________________    Chief Complaint   N/A    History is obtained from the patient    History of Present Illness   Van Ochoa is a 35 year old adult who is seen for a medical evaluation. He reports chronic kidney disease. He denies any recent fevers, chills, dysuria, abd pain, flank pain, CP, SOB. He states he has voided fairly frequently today. Urine is light yellow. He doesn't drink very much fluid, only drank 1.25 cups thus far today as of 2 pm. He also states that he has ALFONSO but hasn't been able to use CPAP lately because of it inducing nightmares.       Past Medical History    Past Medical History:   Diagnosis Date    ADHD     Anxiety     Asthma     Depression     Environmental allergies     Gastroesophageal reflux disease     Gender dysphoria in adult     Hypertension     Hypothyroid     Polycythemia        Past Surgical History   Past Surgical History:   Procedure Laterality Date    ENT SURGERY      Tonsillectomy    IR LUMBAR PUNCTURE  8/30/2018    IR LUMBAR PUNCTURE  8/7/2018    TRANSGENDER MASTECTOMY Bilateral 7/7/2017    Procedure: TRANSGENDER MASTECTOMY;  BILATERAL MASTECTOMY ;  Surgeon: Daphne Ibrahim MD;  Location: [facility]       Medications   Medications Prior to Admission   Medication Sig Dispense Refill Last Dose/Taking    amLODIPine (NORVASC) 5 MG tablet Take 5 mg by mouth 2 times daily.       buPROPion (WELLBUTRIN XL) 150 MG 24 hr tablet Take 150 mg by mouth every morning.       cetirizine (ZYRTEC) 10 MG tablet Take 10 mg by mouth daily as needed for allergies       DULoxetine (CYMBALTA) 30 MG capsule Take 30 mg by mouth daily.       Escitalopram Oxalate (LEXAPRO PO) Take 20 mg by mouth daily       famotidine (PEPCID) 20 MG tablet Take 20 mg by mouth daily.       labetalol (NORMODYNE) 100 MG tablet Take 100 mg by mouth 2 times daily.        levothyroxine (SYNTHROID/LEVOTHROID) 100 MCG tablet Take 100 mcg by mouth daily.       losartan (COZAAR) 25 MG tablet Take 25 mg by mouth daily.       lurasidone (LATUDA) 20 MG TABS tablet Take 20 mg by mouth daily with food.       pantoprazole (PROTONIX) 20 MG EC tablet Take 40 mg by mouth daily.       testosterone cypionate (DEPOTESTOTERONE) 200 MG/ML injection Inject into the muscle once a week Inject .35 mls weekly             Review of Systems    The 10 point Review of Systems is negative other than noted in the HPI.     Physical Exam   Vital Signs: Temp: 98.4  F (36.9  C) Temp src: Temporal BP: (!) 163/109 Pulse: 87     SpO2: 97 % O2 Device: None (Room air)    Weight: 215 lbs 0 oz    GENERAL: adult seen sitting comfortably in bed. NAD.   NEURO / PSYCH: Alert, converses appropriately. No focal deficits. Moves all extremities.   CV: RRR. S1, S2. No murmurs appreciated.    RESPIRATORY: Effort normal. Lungs CTAB with no wheezing, rales, rhonchi.   GI: Abdomen soft and non distended with bowel sounds rare. No TTP or guarding.   MSK: no gross deformities, appropriate gait.   EXTREMITIES: nonedematous  SKIN: No jaundice. No rashes or lesions to exposed areas.     Medical Decision Making       45 MINUTES SPENT BY ME on the date of service doing chart review, history, exam, documentation & further activities per the note.      Data     I have personally reviewed the following data over the past 24 hrs:    N/A  \   N/A   / N/A     135 97 (L) 22.3 (H) /  99   3.5 24 1.69 (H) \

## 2025-02-01 NOTE — CARE PLAN
Rehab Group    Start time: 1315  End time: 1400  Patient time total: 29 minutes    attended partial group    #4 attended   Group Type: occupational therapy   Group Topic Covered: balanced lifestyle, cognitive activities, coping skills, educational support, healthy leisure time, self-care, self-esteem, and social skills       Group Session Detail:   Education and group discussion provided on the benefits of positive thinking with hands on opportunity to make an Affirmation or Gratitude Calendar for concentration, creative expression, coping, mood stabilization, reality-based activity, changing negative thinking into positive, sequencing, organization/planning, follow through, self care practice, building self-esteem and socialization.       Patient Response/Contribution:  socially appropriate, attentive, and actively engaged       Patient Detail:  Pt was pleasant, friendly, socially appropriate and invested in his work.  Pt shared during the check in question and was able to work in a neat and organized manner on his calendar.  He was able to add both the dates and affirmations.  Pt was respectful towards therapist and peers.          [id number] OT Group (2 or more in attendance)      Patient Active Problem List   Diagnosis    Mood disorder in conditions classified elsewhere    Conversion disorder    Attention deficit hyperactivity disorder (ADHD)    Anxiety    Pityriasis rosea    Hyperlipidemia, mixed    Abnormal uterine bleeding    Serum creatinine raised    Polycythemia    Obesity    Hypothyroidism    HTN (hypertension)    Gender dysphoria in adult    Fatty liver    Environmental allergies    Chronic bilateral low back pain with bilateral sciatica    Asthma    Active autistic disorder    Psychosis (H)    Suicidal ideation

## 2025-02-02 LAB
ANION GAP SERPL CALCULATED.3IONS-SCNC: 16 MMOL/L (ref 7–15)
BUN SERPL-MCNC: 18.3 MG/DL (ref 6–20)
CALCIUM SERPL-MCNC: 9.3 MG/DL (ref 8.8–10.4)
CHLORIDE SERPL-SCNC: 99 MMOL/L (ref 98–107)
CREAT SERPL-MCNC: 1.53 MG/DL (ref 0.51–1.17)
EGFRCR SERPLBLD CKD-EPI 2021: 60 ML/MIN/1.73M2
GLUCOSE SERPL-MCNC: 101 MG/DL (ref 70–99)
HCO3 SERPL-SCNC: 23 MMOL/L (ref 22–29)
HOLD SPECIMEN: NORMAL
POTASSIUM SERPL-SCNC: 3.8 MMOL/L (ref 3.4–5.3)
SODIUM SERPL-SCNC: 138 MMOL/L (ref 135–145)

## 2025-02-02 PROCEDURE — 250N000013 HC RX MED GY IP 250 OP 250 PS 637: Performed by: NURSE PRACTITIONER

## 2025-02-02 PROCEDURE — 36415 COLL VENOUS BLD VENIPUNCTURE: CPT | Performed by: PHYSICIAN ASSISTANT

## 2025-02-02 PROCEDURE — 124N000002 HC R&B MH UMMC

## 2025-02-02 PROCEDURE — 250N000013 HC RX MED GY IP 250 OP 250 PS 637: Performed by: PHYSICIAN ASSISTANT

## 2025-02-02 PROCEDURE — 80048 BASIC METABOLIC PNL TOTAL CA: CPT | Performed by: PHYSICIAN ASSISTANT

## 2025-02-02 RX ADMIN — LABETALOL HYDROCHLORIDE 100 MG: 100 TABLET, FILM COATED ORAL at 19:22

## 2025-02-02 RX ADMIN — LORAZEPAM 1 MG: 1 TABLET ORAL at 02:03

## 2025-02-02 RX ADMIN — QUETIAPINE FUMARATE 100 MG: 100 TABLET ORAL at 19:22

## 2025-02-02 RX ADMIN — HYDRALAZINE HYDROCHLORIDE 25 MG: 25 TABLET ORAL at 12:55

## 2025-02-02 RX ADMIN — LABETALOL HYDROCHLORIDE 100 MG: 100 TABLET, FILM COATED ORAL at 08:00

## 2025-02-02 RX ADMIN — AMLODIPINE BESYLATE 5 MG: 5 TABLET ORAL at 19:21

## 2025-02-02 RX ADMIN — NICOTINE POLACRILEX 2 MG: 2 LOZENGE ORAL at 16:33

## 2025-02-02 RX ADMIN — LEVOTHYROXINE SODIUM 100 MCG: 25 TABLET ORAL at 08:00

## 2025-02-02 RX ADMIN — DULOXETINE HYDROCHLORIDE 30 MG: 30 CAPSULE, DELAYED RELEASE ORAL at 08:00

## 2025-02-02 RX ADMIN — NICOTINE POLACRILEX 2 MG: 2 LOZENGE ORAL at 11:42

## 2025-02-02 RX ADMIN — PANTOPRAZOLE SODIUM 40 MG: 40 TABLET, DELAYED RELEASE ORAL at 08:00

## 2025-02-02 RX ADMIN — LOSARTAN POTASSIUM 25 MG: 25 TABLET, FILM COATED ORAL at 08:04

## 2025-02-02 RX ADMIN — AMLODIPINE BESYLATE 5 MG: 5 TABLET ORAL at 08:00

## 2025-02-02 RX ADMIN — OLANZAPINE 10 MG: 10 TABLET, FILM COATED ORAL at 00:28

## 2025-02-02 RX ADMIN — FAMOTIDINE 20 MG: 20 TABLET ORAL at 08:00

## 2025-02-02 RX ADMIN — LURASIDONE HYDROCHLORIDE 40 MG: 40 TABLET, FILM COATED ORAL at 19:22

## 2025-02-02 ASSESSMENT — ACTIVITIES OF DAILY LIVING (ADL)
ADLS_ACUITY_SCORE: 18
ADLS_ACUITY_SCORE: 23
ADLS_ACUITY_SCORE: 23
DIFFICULTY_EATING/SWALLOWING: NO
ADLS_ACUITY_SCORE: 18
ADLS_ACUITY_SCORE: 23
DIFFICULTY_COMMUNICATING: NO
DOING_ERRANDS_INDEPENDENTLY_DIFFICULTY: NO
ADLS_ACUITY_SCORE: 18
CONCENTRATING,_REMEMBERING_OR_MAKING_DECISIONS_DIFFICULTY: NO
ADLS_ACUITY_SCORE: 18
ADLS_ACUITY_SCORE: 18
TOILETING_ISSUES: NO
FALL_HISTORY_WITHIN_LAST_SIX_MONTHS: NO
ADLS_ACUITY_SCORE: 23
ADLS_ACUITY_SCORE: 23
WEAR_GLASSES_OR_BLIND: YES
HYGIENE/GROOMING: INDEPENDENT
ADLS_ACUITY_SCORE: 23
DRESSING/BATHING_DIFFICULTY: NO
ADLS_ACUITY_SCORE: 23
ADLS_ACUITY_SCORE: 18
ADLS_ACUITY_SCORE: 23
ADLS_ACUITY_SCORE: 23
CHANGE_IN_FUNCTIONAL_STATUS_SINCE_ONSET_OF_CURRENT_ILLNESS/INJURY: NO
WALKING_OR_CLIMBING_STAIRS_DIFFICULTY: NO
HEARING_DIFFICULTY_OR_DEAF: NO
ADLS_ACUITY_SCORE: 23
ADLS_ACUITY_SCORE: 18
ADLS_ACUITY_SCORE: 23
ADLS_ACUITY_SCORE: 23

## 2025-02-02 NOTE — PLAN OF CARE
Goal Outcome Evaluation:    Plan of Care Reviewed With: patient      Problem: Adult Behavioral Health Plan of Care  Goal: Adheres to Safety Considerations for Self and Others  Outcome: Progressing  Pt was visible on the unit most of the shift. He was calm and was medication complaint and denied side effects. Pt appeared has been having multiple requests including to be able to go out and get some fresh air, getting his hat to wear and his penguin toy, but he was redirectable. Pt encouraged and given materials to write things down that he would like to discus with the provider tomorrow and he stated that he tends to forget. He denied SI, HI and he contracted for safety. Pt had adequate intake, his BP was 149/94, and pulse was 82 therefore pt did not meet the parameters for prn Hydralazine. Continue to monitor.

## 2025-02-02 NOTE — PLAN OF CARE
Pt appeared to sleep 4.25 hours.  PRN medication of zyprexa and ativan given this shift, see previous note. Pt was seeking out staff this shift with multiple requests. Pt presents as disorganized and making delusional statements. Pt redirected as needed and rest encouraged this shift. Safety checks done at least every 15 minutes.

## 2025-02-02 NOTE — PLAN OF CARE
"Pt presenting as disorganized and making delusional statements. Pt has been intermittently coming to nursing station seeking staff. Pt came out of room after change of shift and attempted to lightly touch [facility] sitter staff and engage with staff. Pt also removing shirt stating it makes him itchy. Pt offered new scrub top and pt said scrub top makes him itchy also. Pt then stated the shirt helps him not feel itchy. Pt redirected as needed and pt able to be redirected at this time. Pt had been concerned about the voices he hears and pt has been making bizaar statements such as \"[place] is coming\" and \"I am [place] and the universe\". Pt initially offered Seroquel for sleep, the decided he wanted to take Zyprexa instead, stating it helps better. Zyprexa given at 0028 hours. Pt appeared to rest for approximately an hour. Pt up to counter multiple times seeking staff and rest encouraged. Pt reported he was feeling anxious and ativan given at 0205 hours. Pt appears drowsy at this time and agreed to try to rest at this time.   "

## 2025-02-02 NOTE — PROGRESS NOTES
Brief Internal Medicine Progress Note    Medicine following for repeat BMP to reevaluate BUN and creatinine. These are improved today, c/w patient's presumed CKD.     Internal medicine will sign off at this time.  Please contact reconsult us with any new medical questions or concerns.      Syeda Hayes PA-C  [facility] Internal Medicine MIRELLA  2/2/2025 9:30 AM

## 2025-02-02 NOTE — PLAN OF CARE
"Goal Outcome Evaluation:    Plan of Care Reviewed With: patient          Patient presents as depressed and anxious.  Came to this RN early in the shift slapping his inner right arm complaining of \"itchy itchy\".  Writer saw the area was reddened slightly from the slapping and gave the patient lotion to apply.  Later, patient was walking around with an ice pack applied to the area, unknown who gave it to him.  No further complaints.    1615 - DB=749/121  P=102  PRN Hydralazine 25 mg administered.  1715 - BP= 154/106 P= 78    1930 - MA=827/103  P=84    Patient asymptomatic, denies headache, dizziness, lightheadedness or other symptoms.    Patient is able to make his needs known.  Eats adequately.  Medication compliant.    Evenings patient starts to request discharge.  Talks about have \"hospital induced PTSD.  My significant others agree to pick me up now.  I can't sleep here.\"  Patient was getting more anxious.  Writer suggested PRN Ativan which patient eventually agreed to take.    Writer and patient changed bed linens.      Patient evaluation continues.         "

## 2025-02-03 PROCEDURE — 250N000013 HC RX MED GY IP 250 OP 250 PS 637: Performed by: NURSE PRACTITIONER

## 2025-02-03 PROCEDURE — 250N000013 HC RX MED GY IP 250 OP 250 PS 637: Performed by: PHYSICIAN ASSISTANT

## 2025-02-03 PROCEDURE — 99232 SBSQ HOSP IP/OBS MODERATE 35: CPT | Performed by: NURSE PRACTITIONER

## 2025-02-03 PROCEDURE — 124N000002 HC R&B MH UMMC

## 2025-02-03 PROCEDURE — 90834 PSYTX W PT 45 MINUTES: CPT | Performed by: COUNSELOR

## 2025-02-03 PROCEDURE — 97150 GROUP THERAPEUTIC PROCEDURES: CPT | Mod: GO

## 2025-02-03 RX ORDER — THERA TABS 400 MCG
1 TAB ORAL DAILY
Status: DISCONTINUED | OUTPATIENT
Start: 2025-02-03 | End: 2025-02-04 | Stop reason: HOSPADM

## 2025-02-03 RX ORDER — FLUORIDE TOOTHPASTE
15 TOOTHPASTE DENTAL 4 TIMES DAILY PRN
Status: DISCONTINUED | OUTPATIENT
Start: 2025-02-03 | End: 2025-02-04 | Stop reason: HOSPADM

## 2025-02-03 RX ORDER — QUETIAPINE FUMARATE 200 MG/1
200 TABLET, FILM COATED ORAL AT BEDTIME
Status: DISCONTINUED | OUTPATIENT
Start: 2025-02-03 | End: 2025-02-04 | Stop reason: HOSPADM

## 2025-02-03 RX ADMIN — AMLODIPINE BESYLATE 5 MG: 5 TABLET ORAL at 21:25

## 2025-02-03 RX ADMIN — OLANZAPINE 10 MG: 10 TABLET, FILM COATED ORAL at 17:22

## 2025-02-03 RX ADMIN — LORAZEPAM 1 MG: 1 TABLET ORAL at 01:27

## 2025-02-03 RX ADMIN — LOSARTAN POTASSIUM 25 MG: 25 TABLET, FILM COATED ORAL at 09:06

## 2025-02-03 RX ADMIN — QUETIAPINE FUMARATE 200 MG: 200 TABLET ORAL at 21:26

## 2025-02-03 RX ADMIN — FAMOTIDINE 20 MG: 20 TABLET ORAL at 09:06

## 2025-02-03 RX ADMIN — OLANZAPINE 10 MG: 10 TABLET, FILM COATED ORAL at 00:15

## 2025-02-03 RX ADMIN — PANTOPRAZOLE SODIUM 40 MG: 40 TABLET, DELAYED RELEASE ORAL at 09:06

## 2025-02-03 RX ADMIN — LABETALOL HYDROCHLORIDE 100 MG: 100 TABLET, FILM COATED ORAL at 21:25

## 2025-02-03 RX ADMIN — DULOXETINE HYDROCHLORIDE 30 MG: 30 CAPSULE, DELAYED RELEASE ORAL at 09:06

## 2025-02-03 RX ADMIN — LABETALOL HYDROCHLORIDE 100 MG: 100 TABLET, FILM COATED ORAL at 09:06

## 2025-02-03 RX ADMIN — LURASIDONE HYDROCHLORIDE 60 MG: 40 TABLET, FILM COATED ORAL at 21:26

## 2025-02-03 RX ADMIN — AMLODIPINE BESYLATE 5 MG: 5 TABLET ORAL at 09:06

## 2025-02-03 RX ADMIN — THERA TABS 1 TABLET: TAB at 09:06

## 2025-02-03 RX ADMIN — LEVOTHYROXINE SODIUM 100 MCG: 25 TABLET ORAL at 11:27

## 2025-02-03 RX ADMIN — LORAZEPAM 1 MG: 1 TABLET ORAL at 22:51

## 2025-02-03 RX ADMIN — QUETIAPINE FUMARATE 100 MG: 100 TABLET ORAL at 02:34

## 2025-02-03 ASSESSMENT — ACTIVITIES OF DAILY LIVING (ADL)
ADLS_ACUITY_SCORE: 18
LAUNDRY: WITH SUPERVISION
ADLS_ACUITY_SCORE: 18
DRESS: INDEPENDENT
ORAL_HYGIENE: INDEPENDENT
HYGIENE/GROOMING: INDEPENDENT
ADLS_ACUITY_SCORE: 18

## 2025-02-03 NOTE — PLAN OF CARE
"Goal Outcome Evaluation:    Plan of Care Reviewed With: patient      Problem: Psychotic Signs/Symptoms  Goal: Improved Behavioral Control (Psychotic Signs/Symptoms)  Outcome: Progressing  Intervention: Manage Behavior  Recent Flowsheet Documentation  Taken 2/2/2025 1642 by Mak Lozada RN  De-Escalation Techniques: appropriate behavior reinforced     Pt was up early, active and social in the milieu.  Pt was alert and oriented x 4, pleasant and cooperative with staff. VS stable. Affect is flat and blunted. Pt reports okay appetite, ate about 100% of dinner with adequate fluid intake.     Pt checked in as doing okay, rated anxiety at 5 and depression at 2  with 10 being worst. Pt rated overall mood as sad.  Pt denies SI/SIB/HI. Denies visual and auditory hallucinations.     Pt was medication compliant, denied pain, medication side effects and medical concerns.    BP (!) 148/89   Pulse 88   Temp 98  F (36.7  C) (Oral)   Ht 1.702 m (5' 7\")   Wt 97.5 kg (215 lb)   SpO2 98%   BMI 33.67 kg/m     "

## 2025-02-03 NOTE — PLAN OF CARE
BEH IP Unit Acuity Rating Score (UARS)  Patient is given one point for every criteria they meet.    CRITERIA SCORING   On a 72 hour hold, court hold, committed, stay of commitment, or revocation. 0    Patient LOS on BEH unit exceeds 20 days. 0  LOS: 4   Patient under guardianship, 55+, otherwise medically complex, or under age 11. 0   Suicide ideation without relief of precipitating factors. 1   Current plan for suicide. 0   Current plan for homicide. 0   Imminent risk or actual attempt to seriously harm another without relief of factors precipitating the attempt. 0   Severe dysfunction in daily living (ex: complete neglect for self care, extreme disruption in vegetative function, extreme deterioration in social interactions). 1   Recent (last 7 days) or current physical aggression in the ED or on unit. 0   Restraints or seclusion episode in past 72 hours. 0   Recent (last 7 days) or current verbal aggression, agitation, yelling, etc., while in the ED or unit. 0   Active psychosis. 1   Need for constant or near constant redirection (from leaving, from others, etc).  0   Intrusive or disruptive behaviors. 0   Patient requires 3 or more hours of individualized nursing care per 8-hour shift (i.e. for ADLs, meds, therapeutic interventions). 0   TOTAL 3

## 2025-02-03 NOTE — PLAN OF CARE
Goal Outcome Evaluation:    IP Treatment Plan    Client's Name: Van Ochoa  YOB: 1990      Treatment Plan Date: February 3, 2025      Anticipated number of sessions for this episode of care: 3    Current Concerns/Problem Areas:    Goal 1: Mood stabilization to achieve and maintain mood levels, reduce frequency, severity, duration of manic or depressive episodes Pt will increase the use of healthy coping skills from 3 times to 6 times/dayPt will identify and implement at least one problem solving strategy       Intervention(s)    Engaged in safety planning identifying coping skills, warning signs, health support and resources, Provided positive reinforcement for progress towards goals, gains in knowledge, and application of skills previously taught, Coached on coping techniques/relaxation skills to help improve distress tolerance and managing intense emotions. , and Engaged in guided discovery, explored patient's perspectives and helped expand them through socratic dialogue      Pt centered considerations  Pt considerations Transgender, female to male, ASD, polyamory/ kink wants practitioners that understand these considerations, trauma informed. He has done EMDR, says he doesn't want that , perhaps A.R.T accelerated resolution therapy  and or Art Therapy.

## 2025-02-03 NOTE — PLAN OF CARE
Pt appeared to sleep 3.25 hours. Pt received PRN's of Zyprexa, Ativan, and Seroquel this shift. Pt reported anxiety, difficulty sleeping, and command hallucinations. Pt needing frequent redirection and reminders. Pt presents as disorganized and making delusional statements. Pt refusing idea of using cpap stating it makes him scared, heard gasping at times while sleeping.Safety checks done at least every 15 minutes.

## 2025-02-03 NOTE — CARE PLAN
Rehab Group    Start time: 1315  End time: 1400  Patient time total: 30 minutes    attended partial group    #2 attended   Group Type: occupational therapy   Group Topic Covered: balanced lifestyle, cognitive activities, coping skills, healthy leisure time, self-esteem, and social skills       Group Session Detail:  Qbitz visual spatial activity for concentration, visual spatial skills, frustration tolerance, problem solving via trial and error, follow through, coping, mood stabilization, reality-based activity, healthy leisure exploration, attention to detail, building self-esteem, and socialization.       Patient Response/Contribution:  attentive and actively engaged       Patient Detail:  Pt was pleasant and and amenable.  Pt worked on the planned activity for the first 15 min he was present, then asked if he could switch to working on a one step activity.  Pt brought supplies with him to do this, so he was allowed.  Pt worked for an additional 15 min then he excused himself to lay down stating he was feeling tired from his medications.          [id number] OT Group (2 or more in attendance)      Patient Active Problem List   Diagnosis    Mood disorder in conditions classified elsewhere    Conversion disorder    Attention deficit hyperactivity disorder (ADHD)    Anxiety    Pityriasis rosea    Hyperlipidemia, mixed    Abnormal uterine bleeding    Serum creatinine raised    Polycythemia    Obesity    Hypothyroidism    HTN (hypertension)    Gender dysphoria in adult    Fatty liver    Environmental allergies    Chronic bilateral low back pain with bilateral sciatica    Asthma    Active autistic disorder    Psychosis (H)    Suicidal ideation

## 2025-02-03 NOTE — PLAN OF CARE
Pt came out of room with shirt off and staff attempted to redirect patient back to room. Pt agreed to put shirt back on after encouragement. Pt then came out of his room and started spraying air freshener near sitter ([facility] staff). Air freshener given to this writer by patient. Pt presenting as disorganized with delusional statements. Pt making bizarre statements. Pt seeking out staff and reminded not to touch or engage with staff sitting with other patients. Pt agreed to Zyprexa and medication given. Pt reporting voices telling him to go and find people. Pt also stating they are telling him about stars and the moon. Pt restless at this time and staff have been encouraging rest.

## 2025-02-03 NOTE — PLAN OF CARE
Goal Outcome Evaluation:    Problem: Adult Behavioral Health Plan of Care  Goal: Plan of Care Review  Outcome: Progressing     Problem: Adult Behavioral Health Plan of Care  Goal: Absence of New-Onset Illness or Injury  Outcome: Progressing     Problem: Adult Behavioral Health Plan of Care  Goal: Optimized Coping Skills in Response to Life Stressors  Outcome: Progressing       Pt is pleasant, polite with requests. Pt self-initiates activities--he attends groups and is appropriate with requests of unit staff. Pt is eager to discharge soon and will likely be able to go home tomorrow. Denies SI. Mood is good.    Pt is present in the milieu, he makes occasional phone calls.     Roula Cannon RN

## 2025-02-03 NOTE — PLAN OF CARE
Upcoming Meetings and Dates/Important Information  Days since IP admission 4  Team Note Due Friday    Next Steps  Nothing, case management completed.    Assessment/Intervention/Current Symptoms and Care Coordination  Chart Review  Met with team to discuss patient care.  Spoke with Nate and scheduled pt for Adult Day Treatment assessment/intake.  Updated AVS    Discharge Plan or Goal  Dispo Plan: Return home with Day treatment or PHP  Transportation: [facility] or medical cab  Medication: In hand  Safety plan complete?: 2/3/25  Appointments: All on AVS.     Barriers to Discharge  patient requires further psychiatric stabilization due to current symptomology, medication management with possible adjustments     Referral Status  No new referral made    Legal Status  voluntary    Contacts  Partner - Jimmy Kim [phone] LEXUS Signed  Partner - Blanka De La Torre [phone] LEXUS Signed  Mother - Farida Ochoa [phone]      Nate [phone]

## 2025-02-03 NOTE — CARE PLAN
Rehab Group    Start time: 1015  End time: 1200  Patient time total: 43 minutes    attended full group     #3 attended   Group Type: occupational therapy   Group Topic Covered: balanced lifestyle, cognitive activities, coping skills, emotional regulation, healthy leisure time, self-esteem, and social skills       Group Session Detail:   Hands on coping skill practice with Sand Art project for concentration, creative expression, attention to detail, frustration tolerance, follow through, concrete problem solving, healthy leisure exploration, mood stabilization, reality-based activity, building self-esteem and socialization.       Patient Response/Contribution:  socially appropriate and actively engaged       Patient Detail:  Pt was pleasant and invested in his project.  Pt worked steadily on his design and was able to complete it.  Pt also worked for a bit on a one step project prior to stating he was feeling like he wanted to leave group so he could spend some time alone in his room.  Pt worked in a neat and organized manner with good attention to detail.  Pt appeared to be pleased with the outcome of both of his projects.  Suspect pt may also have been a bit anxious given the inappropriate behavior of a peer who was acting out during the group time.          [id number] OT Group (2 or more in attendance)      Patient Active Problem List   Diagnosis    Mood disorder in conditions classified elsewhere    Conversion disorder    Attention deficit hyperactivity disorder (ADHD)    Anxiety    Pityriasis rosea    Hyperlipidemia, mixed    Abnormal uterine bleeding    Serum creatinine raised    Polycythemia    Obesity    Hypothyroidism    HTN (hypertension)    Gender dysphoria in adult    Fatty liver    Environmental allergies    Chronic bilateral low back pain with bilateral sciatica    Asthma    Active autistic disorder    Psychosis (H)    Suicidal ideation

## 2025-02-03 NOTE — PLAN OF CARE
Pt came out of room and stood at end of hallway. Pt stating he was still having difficulty resting but wants to rest. PRN Seroquel given without incident. Pt sitting in room with a [place] of cards around him and several books and papers in a stack next to him. Pt states it helps keep him calm. Pt encouraged to lay or sit on bed and pt declined.

## 2025-02-03 NOTE — PROGRESS NOTES
[facility],  Psychiatric Progress Note      Impression:     Van Ochoa is a 35-year-old transgender male admitted to [facility] on 1/30/2025.  He was admitted as a voluntary patient through the [facility] ED due to psychosis, insomnia, depressive symptoms and suicidal ideation with a plan to shoot himself.  He reports that symptoms have increased since Trump was elected with further increase after he was inaugurated.  He did not sleep for 4 days, neglected ADLs and required prompts to eat and use the bathroom per his partner.  While in the ED he appeared disorganized, for example taking down curtains.  Behaviors improved after he took Zyprexa.  He was able to acquire some sleep.  He smokes cannabis daily for chronic pain from fibromyalgia and states he has a medical cannabis card.  UTOX was positive for cannabinoids.  He had not been taking medications regularly, likely about 50% of the time.  In the ED, Zyprexa and PRN Ativan were initiated.  PTA medications Cymbalta, Latuda and Lexapro were continued, and Wellbutrin XL was discontinued.  Following admission to the unit, He was disorganized and touched walls, doors and a staff member's forehead.  He appeared organized during conversation with provider.  He had short-term memory impairment.  He voiced concerns about remaining hospitalized due to his sensitivity to sound and touch.  Since admission, Cymbalta was continued.  Latuda was increased.  Seroquel was initiated.  PRNs of Seroquel, Zyprexa and Ativan were initiated.  He has been disorganized, particularly during the overnight shift, when he has been observed touching staff.  He has been having visual and auditory hallucinations.  He has had difficulty sleeping.           DIagnoses:     Unspecified psychosis, rule out bipolar disorder  Generalized anxiety disorder  PTSD  Gender dysphoria  Mild autism spectrum disorder   ADHD per  "history   Hypertension  GERD  Environmental allergies  Hypothyroidism  CKD II-III  Obstructive sleep apnea         Plan:     Medications:  Continue Cymbalta 30 mg daily.  Increase Latuda to 60 mg at HS with at least 350 calories.   Increase Seroquel to 200 mg at HS.  PRNs of Zyprexa and Ativan are available.    Internal medicine was following for CKD and hypertension and have signed off.       Discharge to home when stable.  He has outpatient psychiatry, therapy and [facility].  He would like a day treatment referral.  He is scheduled for an ENT surgery 2/12/2025.        Attestation:  Patient has been seen and evaluated by me, Chanelle Tillman, MARCY CNP  The patient was counseled on nature of illness and treatment plan/options  Care was coordinated with treatment team  Total time > 35 minutes        Interim History:     The patient's care was discussed with the treatment team and chart notes were reviewed.  Pt was documented as sleeping 2, 4.25 and 3.25 hours during the weekend overnight shifts.  He took PRNs of Hydralazine, Ativan, Zyprexa and Seroquel.  He told staff that Zyprexa was more effective than Seroquel.  He was seen by internal medicine due to hypertension and CKD.  Internal medicine has signed off.  He was disorganized, particularly during the overnight shift when he removed his shirt and exited his room and lightly touched staff.  He said, \"I am mercury and the universe.\"  He reported visual hallucinations of lava.  He also endorsed auditory hallucinations.  He frequently sought out staff.  He spent some time coloring.  He attended some groups with variable focus.  Today, pt reports he had a difficult weekend because he has a \"phobia of hospitals or anything medical related,\" so he has been feeling agitated.  Pt states he misses his partners.  He was unaware that visitors are allowed.  Provider informed him of visiting hours.  Pt states he does like OT and is very much looking forward to these groups " "today.  He states his short term memory and focus are improved.  He reports he has been napping 2-4 hours per day.  He says he feels tired during the day and night regardless of how much he sleeps.  He appeared alert during the conversation.  He was mildly disorganized.  Pt denies visual hallucinations and stated he was referring to a game when he said the floor is lava.  When asked about auditory hallucinations, he responded, \"Not that I'm aware of but probably because of the nature of things right now.\"  Pt states he has been making efforts to drink more fluids.  Appetite is improved.  He asked for a multivitamin.  He reports dry mouth and requested Biotene.  He agreed to increase Latuda and Seroquel.  States if he wakes in the middle of the night he will probably take PRN Zyprexa rather than PRN Seroquel.  Pt is willing to participate in IOP after discharge.  He is hoping for discharge soon.  He discussed future goals to engage with crafts, writing, music and learning to become a DJ.           Medications:     Current Facility-Administered Medications   Medication Dose Route Frequency Provider Last Rate Last Admin    acetaminophen (TYLENOL) tablet 650 mg  650 mg Oral Q4H PRN Chanelle Tillman APRN CNP        alum & mag hydroxide-simethicone (MAALOX) suspension 30 mL  30 mL Oral Q4H PRN Chanelle Tillman APRN CNP        amLODIPine (NORVASC) tablet 5 mg  5 mg Oral BID Chanelle Tillman APRN CNP   5 mg at 02/02/25 1921    artificial saliva (BIOTENE DRY MOUTHWASH) liquid 15 mL  15 mL Swish & Spit 4x Daily PRN Chanelle Tillman APRN CNP        cetirizine (zyrTEC) tablet 10 mg  10 mg Oral Daily PRN Chanelle Tillman APRN CNP        DULoxetine (CYMBALTA) DR capsule 30 mg  30 mg Oral Daily Chanelle Tillman APRN CNP   30 mg at 02/02/25 0800    famotidine (PEPCID) tablet 20 mg  20 mg Oral Daily Chanelle Tillman APRN CNP   20 mg at 02/02/25 0800    hydrALAZINE (APRESOLINE) tablet " 25 mg  25 mg Oral Q6H PRN Chanelle Tillman APRN CNP   25 mg at 02/02/25 1255    labetalol (NORMODYNE) tablet 100 mg  100 mg Oral BID Chanelle Tillman APRN CNP   100 mg at 02/02/25 1922    levothyroxine (SYNTHROID/LEVOTHROID) tablet 100 mcg  100 mcg Oral Daily Chanelle Tillman APRN CNP   100 mcg at 02/02/25 0800    LORazepam (ATIVAN) tablet 1 mg  1 mg Oral Q4H PRN Chanelle Tillman APRN CNP   1 mg at 02/03/25 0127    losartan (COZAAR) tablet 25 mg  25 mg Oral Daily Syeda Hayes PA-C   25 mg at 02/02/25 0804    lurasidone (LATUDA) tablet 60 mg  60 mg Oral QPM Chanelle Tillman APRN CNP        multivitamin, therapeutic (THERA-VIT) tablet 1 tablet  1 tablet Oral Daily Chanelle Tillman APRN CNP        nicotine (COMMIT) lozenge 2 mg  2 mg Buccal Q1H PRN Chanelle Tilmlan APRN CNP   2 mg at 02/02/25 1633    OLANZapine (zyPREXA) tablet 10 mg  10 mg Oral TID PRN Chanelle Tillman APRN CNP   10 mg at 02/03/25 0015    Or    OLANZapine (zyPREXA) injection 10 mg  10 mg Intramuscular TID PRN Chanelle Tillman APRN CNP        pantoprazole (PROTONIX) EC tablet 40 mg  40 mg Oral Daily Chanelle Tillman APRN CNP   40 mg at 02/02/25 0800    QUEtiapine (SEROquel) tablet 100 mg  100 mg Oral At Bedtime PRN Chanelle Tillman APRN CNP   100 mg at 02/03/25 0234    QUEtiapine (SEROquel) tablet 200 mg  200 mg Oral At Bedtime Chanelle Tillman APRN CNP        senna-docusate (SENOKOT-S/PERICOLACE) 8.6-50 MG per tablet 1 tablet  1 tablet Oral BID PRN Chanelle Tillman APRN CNP        testosterone cypionate (DEPOTESTOSTERONE) injection 70 mg  70 mg Intramuscular Weekly Chanelle Tillman APRN CNP   70 mg at 01/31/25 0910             Allergies:     Allergies   Allergen Reactions    Atomoxetine Other (See Comments)     Worsening depression     Banana      Itchy throat      Lisinopril Other (See Comments)     Elevated Creatinine    Morphine      itchy    Morphine  "Itching    Adhesive Tape Rash          Psychiatric Examination:     BP (!) 148/89   Pulse 88   Temp 98  F (36.7  C) (Oral)   Ht 1.702 m (5' 7\")   Wt 97.5 kg (215 lb)   SpO2 98%   BMI 33.67 kg/m    Weight is 215 lbs 0 oz  Body mass index is 33.67 kg/m .    Appearance:  awake, alert, adequately groomed, and dressed in hospital scrubs  Attitude:  cooperative  Eye Contact:  good  Mood:  \"agitated\"  Affect:  normal range  Speech:  clear, coherent  Psychomotor Behavior:  no evidence of tardive dyskinesia, dystonia, or tics  Thought Process:  mildly disorganized, goal-oriented, less than logical at times  Associations:  no loose associations  Thought Content:  denies hallucinations, denies suicidal and homicidal ideation  Insight:  partial  Judgment:  limited  Oriented to:  month/year, time, person, and place  Attention Span and Concentration:  some impairment, reports improvement  Recent and Remote Memory:  short-term impairment, reports improvement  Language:  intact, fluent English  Fund of Knowledge:  appropriate  Muscle Strength and Tone:  normal  Gait and Station:  normal         Labs:     Recent Results (from the past 24 hours)   Basic metabolic panel    Collection Time: 02/02/25  7:56 AM   Result Value Ref Range    Sodium 138 135 - 145 mmol/L    Potassium 3.8 3.4 - 5.3 mmol/L    Chloride 99 98 - 107 mmol/L    Carbon Dioxide (CO2) 23 22 - 29 mmol/L    Anion Gap 16 (H) 7 - 15 mmol/L    Urea Nitrogen 18.3 6.0 - 20.0 mg/dL    Creatinine 1.53 (H) 0.51 - 1.17 mg/dL    GFR Estimate 60 (L) >60 mL/min/1.73m2    Calcium 9.3 8.8 - 10.4 mg/dL    Glucose 101 (H) 70 - 99 mg/dL   Extra Purple Top Tube    Collection Time: 02/02/25  7:56 AM   Result Value Ref Range    Hold Specimen JIC      "

## 2025-02-03 NOTE — PLAN OF CARE
Problem: Psychotic Signs/Symptoms  Goal: Improved Behavioral Control (Psychotic Signs/Symptoms)  Outcome: Not Progressing     Pt came out of room with his shirt off and was stating there was lava all over the floor and it made him anxious. Pt redirected back into room with encouragement. Pt put shirt back on and had cards and books lined up on the floor. Pt stated he was anxious and ativan given without incident. Pt stating he will be up all night until the sun but wants to go home soon. Pt encouraged to rest and discussed importance of rest. Pt sitting in bed at this time stating he was going to try to slow the thoughts down in his head and try to rest.

## 2025-02-03 NOTE — PLAN OF CARE
"  Problem: Adult Behavioral Health Plan of Care  Goal: Optimized Coping Skills in Response to Life Stressors  Outcome: Progressing   Goal Outcome Evaluation:    Patient watches t.v. He reports feeling \"jittery\" and anxious and being in the hospital and is looking forward to discharging soon. He said that he has been dancing and pacing to move the anxious energy out of him. Pt reports bilateral feet pain related to being on his feet so much. He declines prn's. Said he is feeling depressed about being in the hospital, denied SI and reports no HA. He has requests for prn Melatonin, beanie, phone, journal, coloring supplies.   Pt pacing in room and said he feels agitated and would like to leave the hospital now. He said he wants to go and that everything is outside of the hospital and \"I just want my people\" Pt reports he is havng a \"meltdown\". Prn Zyprexa offered and given. .He perseverates about leaving the hospital. He was asked if he would be willing to talk with his care team tomorrow about discharge. Pt continues to complain about being in the hospital. 12 hour intent to leave form offered and explained. He chooses not to sign it. He mentions that he can't even have visitors here. He was informed that he may have visitors (up to 2 people) and reviewed times and he was given another visitor card. Pt has visitors and pt appeared much more content. He attends evening group. Given scheduled medications. Requested and given prn Ativan for anxiety.                      "

## 2025-02-03 NOTE — PLAN OF CARE
"Goal Outcome Evaluation:    Individual Therapy Note      Date of Service: February 3, 2025    Patient: Van goes by \"Van,\" uses he/him pronouns    Individuals Present: Van & Jorge THERESE Bloom    Session start: 12:00 pm  Session end: 12:40 pm  Session duration in minutes: 40      Modality Used: Person Centered, Rapport Building, and Motivational Interviewing    Goals: Safety planning, talk about life/ circumstances    Patient Description of current symptoms: talked about feeling better, talked about some of his needs for therapy including someone who understands transgender issues, ASD, polyamory/ kink and trauma informed modality. He mentioned A.R.T accelerated resolution therapy. He also mentioned he loves all arts and crafts. Perhaps visual Art Therapy could also be a benefit in the community.     Mental Status Exam:   Attitude: cooperative  Eye Contact: fair  Mood: anxious  Affect: intensity is exaggerated  Speech: clear, coherent  Psychomotor Behavior: no evidence of tardive dyskinesia, dystonia, or tics  Thought Process:  goal oriented  Associations: no loose associations  Thought Content: no evidence of suicidal ideation or homicidal ideation  Insight: fair  Judgement: fair  Attention Span and Concentration: intact    Pt progress: pt is forward thinking, looking towards resources that can be helpful Says the group of people he lives with \" feels like home.\"    Treatment Objective(s) Addressed:   The focus of this session was on rapport building, orienting the patient to therapy, safety planning, identifying an appropriate aftercare plan, and identifying additional supports     Progress Towards Goals and Assessment of Patient:   Patient is making progress towards treatment goals as evidenced by willingness to engage in therapy on unit and in the community.       Therapeutic Intervention(s):   Provided active listening, unconditional positive regard, and validation.   Engaged in safety planning identifying " coping skills, warning signs, health support and resources, Understand and identify reasons for hospitalization, how to use the hospital to create change and prevent future hospitalizations , Provided positive reinforcement for progress towards goals, gains in knowledge, and application of skills previously taught, Coached on coping techniques/relaxation skills to help improve distress tolerance and managing intense emotions. , and Reviewed healthy living that supports positive mental health, including looking at sleep hygiene, regular movement, nutrition, and regular socialization      Plan/next step: shared resource discussion he had with writer with his [facility] . He has an [facility] worker that is helping him in the community as well    92455 - Psychotherapy (with patient) - 45 (38-52*) min    Patient Active Problem List   Diagnosis    Mood disorder in conditions classified elsewhere    Conversion disorder    Attention deficit hyperactivity disorder (ADHD)    Anxiety    Pityriasis rosea    Hyperlipidemia, mixed    Abnormal uterine bleeding    Serum creatinine raised    Polycythemia    Obesity    Hypothyroidism    HTN (hypertension)    Gender dysphoria in adult    Fatty liver    Environmental allergies    Chronic bilateral low back pain with bilateral sciatica    Asthma    Active autistic disorder    Psychosis (H)    Suicidal ideation

## 2025-02-04 VITALS
HEIGHT: 67 IN | TEMPERATURE: 98.2 F | OXYGEN SATURATION: 97 % | BODY MASS INDEX: 34.12 KG/M2 | WEIGHT: 217.37 LBS | HEART RATE: 105 BPM | DIASTOLIC BLOOD PRESSURE: 91 MMHG | RESPIRATION RATE: 16 BRPM | SYSTOLIC BLOOD PRESSURE: 151 MMHG

## 2025-02-04 PROCEDURE — 250N000013 HC RX MED GY IP 250 OP 250 PS 637: Performed by: PHYSICIAN ASSISTANT

## 2025-02-04 PROCEDURE — 250N000013 HC RX MED GY IP 250 OP 250 PS 637: Performed by: NURSE PRACTITIONER

## 2025-02-04 PROCEDURE — 99239 HOSP IP/OBS DSCHRG MGMT >30: CPT | Performed by: NURSE PRACTITIONER

## 2025-02-04 RX ORDER — LEVOTHYROXINE SODIUM 100 UG/1
100 TABLET ORAL DAILY
Qty: 30 TABLET | Refills: 1 | Status: SHIPPED | OUTPATIENT
Start: 2025-02-04

## 2025-02-04 RX ORDER — LOSARTAN POTASSIUM 25 MG/1
25 TABLET ORAL DAILY
Qty: 30 TABLET | Refills: 1 | Status: SHIPPED | OUTPATIENT
Start: 2025-02-04

## 2025-02-04 RX ORDER — AMLODIPINE BESYLATE 5 MG/1
5 TABLET ORAL 2 TIMES DAILY
Qty: 60 TABLET | Refills: 1 | Status: SHIPPED | OUTPATIENT
Start: 2025-02-04

## 2025-02-04 RX ORDER — PANTOPRAZOLE SODIUM 40 MG/1
40 TABLET, DELAYED RELEASE ORAL DAILY
Qty: 30 TABLET | Refills: 1 | Status: SHIPPED | OUTPATIENT
Start: 2025-02-04

## 2025-02-04 RX ORDER — CETIRIZINE HYDROCHLORIDE 10 MG/1
10 TABLET ORAL DAILY PRN
Qty: 30 TABLET | Refills: 1 | Status: SHIPPED | OUTPATIENT
Start: 2025-02-04

## 2025-02-04 RX ORDER — LABETALOL 100 MG/1
100 TABLET, FILM COATED ORAL 2 TIMES DAILY
Qty: 60 TABLET | Refills: 1 | Status: SHIPPED | OUTPATIENT
Start: 2025-02-04

## 2025-02-04 RX ORDER — LURASIDONE HYDROCHLORIDE 60 MG/1
60 TABLET, FILM COATED ORAL EVERY EVENING
Qty: 30 TABLET | Refills: 1 | Status: SHIPPED | OUTPATIENT
Start: 2025-02-04

## 2025-02-04 RX ORDER — OLANZAPINE 10 MG/1
10 TABLET ORAL 2 TIMES DAILY PRN
Qty: 60 TABLET | Refills: 1 | Status: SHIPPED | OUTPATIENT
Start: 2025-02-04

## 2025-02-04 RX ORDER — FLUORIDE TOOTHPASTE
15 TOOTHPASTE DENTAL 4 TIMES DAILY PRN
Qty: 473 ML | Refills: 1 | Status: SHIPPED | OUTPATIENT
Start: 2025-02-04

## 2025-02-04 RX ORDER — DULOXETIN HYDROCHLORIDE 30 MG/1
30 CAPSULE, DELAYED RELEASE ORAL DAILY
Qty: 30 CAPSULE | Refills: 1 | Status: SHIPPED | OUTPATIENT
Start: 2025-02-04

## 2025-02-04 RX ORDER — FAMOTIDINE 20 MG/1
20 TABLET, FILM COATED ORAL DAILY
Qty: 30 TABLET | Refills: 1 | Status: SHIPPED | OUTPATIENT
Start: 2025-02-04

## 2025-02-04 RX ORDER — QUETIAPINE FUMARATE 200 MG/1
200 TABLET, FILM COATED ORAL AT BEDTIME
Qty: 30 TABLET | Refills: 1 | Status: SHIPPED | OUTPATIENT
Start: 2025-02-04

## 2025-02-04 RX ORDER — THERA TABS 400 MCG
1 TAB ORAL DAILY
Qty: 30 TABLET | Refills: 1 | Status: SHIPPED | OUTPATIENT
Start: 2025-02-04

## 2025-02-04 RX ORDER — QUETIAPINE FUMARATE 100 MG/1
100 TABLET, FILM COATED ORAL
Qty: 30 TABLET | Refills: 1 | Status: SHIPPED | OUTPATIENT
Start: 2025-02-04

## 2025-02-04 RX ADMIN — DULOXETINE HYDROCHLORIDE 30 MG: 30 CAPSULE, DELAYED RELEASE ORAL at 08:38

## 2025-02-04 RX ADMIN — FAMOTIDINE 20 MG: 20 TABLET ORAL at 08:39

## 2025-02-04 RX ADMIN — OLANZAPINE 10 MG: 10 TABLET, FILM COATED ORAL at 00:02

## 2025-02-04 RX ADMIN — LOSARTAN POTASSIUM 25 MG: 25 TABLET, FILM COATED ORAL at 08:39

## 2025-02-04 RX ADMIN — PANTOPRAZOLE SODIUM 40 MG: 40 TABLET, DELAYED RELEASE ORAL at 08:39

## 2025-02-04 RX ADMIN — AMLODIPINE BESYLATE 5 MG: 5 TABLET ORAL at 08:38

## 2025-02-04 RX ADMIN — LORAZEPAM 1 MG: 1 TABLET ORAL at 03:16

## 2025-02-04 RX ADMIN — LABETALOL HYDROCHLORIDE 100 MG: 100 TABLET, FILM COATED ORAL at 08:38

## 2025-02-04 RX ADMIN — LEVOTHYROXINE SODIUM 100 MCG: 25 TABLET ORAL at 08:38

## 2025-02-04 RX ADMIN — THERA TABS 1 TABLET: TAB at 08:38

## 2025-02-04 ASSESSMENT — ACTIVITIES OF DAILY LIVING (ADL)
ADLS_ACUITY_SCORE: 18
ADLS_ACUITY_SCORE: 18
DRESS: INDEPENDENT
ADLS_ACUITY_SCORE: 18
LAUNDRY: WITH SUPERVISION
ADLS_ACUITY_SCORE: 18
ORAL_HYGIENE: INDEPENDENT
ADLS_ACUITY_SCORE: 18
HYGIENE/GROOMING: INDEPENDENT
ADLS_ACUITY_SCORE: 18
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
## 2025-02-04 NOTE — PROGRESS NOTES
Rehab Group    Start time: 2000  End time: 2040  Patient time total: 40 minutes    attended full group    #5 attended   Group Type: recreation   Group Topic Covered: activity therapy, cognitive activities, and healthy leisure time       Group Session Detail:  TR leisure group     Patient Response/Contribution:  withdrawn       Patient Detail:    Pt attended the structured Therapeutic Recreation group with a focus on leisure participation, stress reduction, and social engagement via a group game. Pt chose not to participate in the activity and decided to color with own supplies during the group. Pt was withdrawn and did not interact with the others in group and was not engaged in the activity for most of the time.      No Charge      Patient Active Problem List   Diagnosis    Mood disorder in conditions classified elsewhere    Conversion disorder    Attention deficit hyperactivity disorder (ADHD)    Anxiety    Pityriasis rosea    Hyperlipidemia, mixed    Abnormal uterine bleeding    Serum creatinine raised    Polycythemia    Obesity    Hypothyroidism    HTN (hypertension)    Gender dysphoria in adult    Fatty liver    Environmental allergies    Chronic bilateral low back pain with bilateral sciatica    Asthma    Active autistic disorder    Psychosis (H)    Suicidal ideation

## 2025-02-04 NOTE — PLAN OF CARE
Problem: Psychotic Signs/Symptoms  Goal: Improved Sleep (Psychotic Signs/Symptoms)  Outcome: Not Progressing     Problem: Psychotic Signs/Symptoms  Goal: Improved Behavioral Control (Psychotic Signs/Symptoms)  Outcome: Not Progressing   Goal Outcome Evaluation:    Pt intermittently resting and coming out of room. Pt sat in hallway near room without shirt on and reminded to put on shirt. Pt agreed to lay down. Pt later came out and started to walk toward another pt's room. Pt redirected by staff in hallway. Pt reported anxiety stating the bathroom light is watching him. Door moved up on bathroom and pt agreeable with ativan. Pt polite and redirectable. Pt appears disorganized, making delusional statements, and impulsive. Rest encouraged. Pt heard snoring loudly when doing safety rounds. Pt also seen sleeping sitting up in bed at times. Frequent checks on pt to ensure safety and make sure pt is sitting on bed in position where he can lay down with pillows behind him.

## 2025-02-04 NOTE — PLAN OF CARE
Pt. States ready for discharge and is requesting discharge, will be returning home with his partner.  Pt. Reports no suicidal ideation, self-injurious behavior.  Pt. Reports that his thoughts are clear, reality based. Pt. States that he understands his therapeutic discharge plan and medication regime and has no concerns or questions.  Pt. States that he will follow his medication regime and his therapeutic discharge plan.

## 2025-02-04 NOTE — PLAN OF CARE
"Pt intermittently out of room with shirt off. Pt redirected to room and agreed to put on shirt when in hallway. Pt making delusional statements such as \"I am all father, they are telling me to seek me out. I am a bird, I am flying right now and they are telling me to fly in the clouds.\" Pt agreed to zyprexa at 0200 hours. Pt agreeable with blood pressure check. See flowsheet. Pt reminded not to engage with staff that are sitting with patients as he went to talk to sitter. Pt agreeable to sit in bed and attempt to rest.  "

## 2025-02-06 ENCOUNTER — PATIENT OUTREACH (OUTPATIENT)
Dept: CARE COORDINATION | Facility: CLINIC | Age: 35
End: 2025-02-06
Payer: COMMERCIAL

## 2025-02-06 NOTE — PROGRESS NOTES
Connected Care Resource Center Contact  [facility]/Voicemail     Clinical Data: Post-Discharge Outreach     Outreach attempted x 2.  Left message on patient's voicemail, providing [facility]'s central phone number of [phone] ([phone]) for questions/concerns and/or to schedule an appt with an [facility] provider, if they do not have a PCP.      Plan:  [facility] will do no further outreaches at this time.       JASON Mahmood  Connected [facility] Resource Center, [facility]    *Connected Care Resource Team does NOT follow patient ongoing. Referrals are identified based on internal discharge reports and the outreach is to ensure patient has an understanding of their discharge instructions.

## 2025-02-18 NOTE — PROGRESS NOTES
On 1/31/2025 Testosterone  70 mg was administered by writer, was witnessed by Ghanshyam Cook. A total of 130 mg was wasted, but accidentally recorded as 60 instead of 130.   This was a documentation error on the amount that was wasted.  Tiffanie Dawkins RN on 2/18/2025 at 1:29 PM

## (undated) DEVICE — SU MONOCRYL 3-0 PS-2 27" Y427H

## (undated) DEVICE — DRSG KERLIX 4 1/2"X4YDS ROLL 6715

## (undated) DEVICE — SU PLAIN FAST ABSORB 5-0 PC-1 18" 1915G

## (undated) DEVICE — DRAIN JACKSON PRATT RESERVOIR 100ML SU130-1305

## (undated) DEVICE — DRSG STERI STRIP 1/2X4" R1547

## (undated) DEVICE — GLOVE PROTEXIS BLUE W/NEU-THERA 6.5  2D73EB65

## (undated) DEVICE — PREP SKIN SCRUB TRAY 4461A

## (undated) DEVICE — DRSG XEROFORM 1X8"

## (undated) DEVICE — TUBING SUCTION LIPECTOMY

## (undated) DEVICE — SUCTION CANISTER MEDIVAC LINER 3000ML W/LID 65651-530

## (undated) DEVICE — DRAIN JACKSON PRATT 15FR ROUND SU130-1323

## (undated) DEVICE — SOL ADH LIQUID BENZOIN SWAB 0.6ML C1544

## (undated) DEVICE — SU ETHILON 3-0 FS-1 18" 669H

## (undated) DEVICE — SPONGE LAP 18X18" X8435

## (undated) DEVICE — SOL NACL 0.9% IRRIG 1000ML BOTTLE 07138-09

## (undated) DEVICE — PAD CHUX UNDERPAD 23X24" 7136

## (undated) DEVICE — DRAPE BREAST/CHEST 29420

## (undated) DEVICE — PIN SAFTY INF 1.5" STERILE SS C18700-020

## (undated) DEVICE — PEN MARKING SKIN

## (undated) DEVICE — BLADE KNIFE SURG 20 371120

## (undated) DEVICE — SU MONOCRYL 4-0 PS-2 18" UND Y496G

## (undated) DEVICE — DRSG COTTON BALL 6PK LCB62

## (undated) DEVICE — BNDG ELASTIC 4" DBL LENGTH UNSTERILE 6611-14

## (undated) DEVICE — LINEN TOWEL PACK X5 5464

## (undated) DEVICE — ESU ELEC NDL 1" COATED/INSULATED E1465

## (undated) DEVICE — PACK MAJOR SBA15MAFSI

## (undated) RX ORDER — PROPOFOL 10 MG/ML
INJECTION, EMULSION INTRAVENOUS
Status: DISPENSED
Start: 2017-07-07

## (undated) RX ORDER — FENTANYL CITRATE 50 UG/ML
INJECTION, SOLUTION INTRAMUSCULAR; INTRAVENOUS
Status: DISPENSED
Start: 2017-07-07

## (undated) RX ORDER — CEFAZOLIN SODIUM 1 G/3ML
INJECTION, POWDER, FOR SOLUTION INTRAMUSCULAR; INTRAVENOUS
Status: DISPENSED
Start: 2017-07-07

## (undated) RX ORDER — MINERAL OIL/HYDROPHIL PETROLAT
OINTMENT (GRAM) TOPICAL
Status: DISPENSED
Start: 2017-07-07

## (undated) RX ORDER — ONDANSETRON 2 MG/ML
INJECTION INTRAMUSCULAR; INTRAVENOUS
Status: DISPENSED
Start: 2017-07-07

## (undated) RX ORDER — LIDOCAINE HYDROCHLORIDE 20 MG/ML
INJECTION, SOLUTION EPIDURAL; INFILTRATION; INTRACAUDAL; PERINEURAL
Status: DISPENSED
Start: 2017-07-07

## (undated) RX ORDER — HYDROCODONE BITARTRATE AND ACETAMINOPHEN 5; 325 MG/1; MG/1
TABLET ORAL
Status: DISPENSED
Start: 2017-07-07

## (undated) RX ORDER — CEFAZOLIN SODIUM 2 G/100ML
INJECTION, SOLUTION INTRAVENOUS
Status: DISPENSED
Start: 2017-07-07

## (undated) RX ORDER — ACETAMINOPHEN 325 MG/1
TABLET ORAL
Status: DISPENSED
Start: 2017-07-07

## (undated) RX ORDER — HYDROXYZINE HYDROCHLORIDE 25 MG/1
TABLET, FILM COATED ORAL
Status: DISPENSED
Start: 2017-07-07